# Patient Record
Sex: FEMALE | Race: WHITE | NOT HISPANIC OR LATINO | Employment: FULL TIME | URBAN - METROPOLITAN AREA
[De-identification: names, ages, dates, MRNs, and addresses within clinical notes are randomized per-mention and may not be internally consistent; named-entity substitution may affect disease eponyms.]

---

## 2021-08-03 ENCOUNTER — HOSPITAL ENCOUNTER (OUTPATIENT)
Facility: MEDICAL CENTER | Age: 61
End: 2021-08-05
Attending: EMERGENCY MEDICINE | Admitting: STUDENT IN AN ORGANIZED HEALTH CARE EDUCATION/TRAINING PROGRAM
Payer: COMMERCIAL

## 2021-08-03 DIAGNOSIS — C54.1 ENDOMETRIAL CANCER (HCC): ICD-10-CM

## 2021-08-03 DIAGNOSIS — N93.9 VAGINAL BLEEDING: ICD-10-CM

## 2021-08-03 DIAGNOSIS — D64.9 SYMPTOMATIC ANEMIA: ICD-10-CM

## 2021-08-03 LAB
ABO GROUP BLD: NORMAL
ALBUMIN SERPL BCP-MCNC: 3.3 G/DL (ref 3.2–4.9)
ALBUMIN/GLOB SERPL: 1.2 G/DL
ALP SERPL-CCNC: 55 U/L (ref 30–99)
ALT SERPL-CCNC: 17 U/L (ref 2–50)
ANION GAP SERPL CALC-SCNC: 11 MMOL/L (ref 7–16)
AST SERPL-CCNC: 21 U/L (ref 12–45)
BASOPHILS # BLD AUTO: 0.1 % (ref 0–1.8)
BASOPHILS # BLD: 0.01 K/UL (ref 0–0.12)
BILIRUB SERPL-MCNC: 0.4 MG/DL (ref 0.1–1.5)
BLD GP AB SCN SERPL QL: NORMAL
BUN SERPL-MCNC: 18 MG/DL (ref 8–22)
CALCIUM SERPL-MCNC: 8.3 MG/DL (ref 8.5–10.5)
CHLORIDE SERPL-SCNC: 100 MMOL/L (ref 96–112)
CO2 SERPL-SCNC: 22 MMOL/L (ref 20–33)
CREAT SERPL-MCNC: 0.72 MG/DL (ref 0.5–1.4)
EOSINOPHIL # BLD AUTO: 0 K/UL (ref 0–0.51)
EOSINOPHIL NFR BLD: 0 % (ref 0–6.9)
ERYTHROCYTE [DISTWIDTH] IN BLOOD BY AUTOMATED COUNT: 55.8 FL (ref 35.9–50)
GLOBULIN SER CALC-MCNC: 2.8 G/DL (ref 1.9–3.5)
GLUCOSE BLD-MCNC: 196 MG/DL (ref 65–99)
GLUCOSE SERPL-MCNC: 205 MG/DL (ref 65–99)
HCT VFR BLD AUTO: 25.2 % (ref 37–47)
HGB BLD-MCNC: 7.6 G/DL (ref 12–16)
IMM GRANULOCYTES # BLD AUTO: 0.07 K/UL (ref 0–0.11)
IMM GRANULOCYTES NFR BLD AUTO: 0.6 % (ref 0–0.9)
INR PPP: 1.1 (ref 0.87–1.13)
LYMPHOCYTES # BLD AUTO: 1.48 K/UL (ref 1–4.8)
LYMPHOCYTES NFR BLD: 13.4 % (ref 22–41)
MCH RBC QN AUTO: 23.2 PG (ref 27–33)
MCHC RBC AUTO-ENTMCNC: 30.2 G/DL (ref 33.6–35)
MCV RBC AUTO: 77.1 FL (ref 81.4–97.8)
MONOCYTES # BLD AUTO: 0.29 K/UL (ref 0–0.85)
MONOCYTES NFR BLD AUTO: 2.6 % (ref 0–13.4)
NEUTROPHILS # BLD AUTO: 9.18 K/UL (ref 2–7.15)
NEUTROPHILS NFR BLD: 83.3 % (ref 44–72)
NRBC # BLD AUTO: 0.02 K/UL
NRBC BLD-RTO: 0.2 /100 WBC
PLATELET # BLD AUTO: 198 K/UL (ref 164–446)
PMV BLD AUTO: 11.6 FL (ref 9–12.9)
POTASSIUM SERPL-SCNC: 3.9 MMOL/L (ref 3.6–5.5)
PROT SERPL-MCNC: 6.1 G/DL (ref 6–8.2)
PROTHROMBIN TIME: 13.9 SEC (ref 12–14.6)
RBC # BLD AUTO: 3.27 M/UL (ref 4.2–5.4)
RH BLD: NORMAL
SODIUM SERPL-SCNC: 133 MMOL/L (ref 135–145)
WBC # BLD AUTO: 11 K/UL (ref 4.8–10.8)

## 2021-08-03 PROCEDURE — 80053 COMPREHEN METABOLIC PANEL: CPT

## 2021-08-03 PROCEDURE — 86901 BLOOD TYPING SEROLOGIC RH(D): CPT

## 2021-08-03 PROCEDURE — 82962 GLUCOSE BLOOD TEST: CPT

## 2021-08-03 PROCEDURE — 85025 COMPLETE CBC W/AUTO DIFF WBC: CPT

## 2021-08-03 PROCEDURE — 86900 BLOOD TYPING SEROLOGIC ABO: CPT

## 2021-08-03 PROCEDURE — 85610 PROTHROMBIN TIME: CPT

## 2021-08-03 PROCEDURE — 99285 EMERGENCY DEPT VISIT HI MDM: CPT

## 2021-08-03 PROCEDURE — 86850 RBC ANTIBODY SCREEN: CPT

## 2021-08-03 RX ORDER — FERROUS SULFATE 325(65) MG
325 TABLET ORAL DAILY
Status: ON HOLD | COMMUNITY
End: 2023-01-01

## 2021-08-03 RX ORDER — ROSUVASTATIN CALCIUM 40 MG/1
40 TABLET, COATED ORAL DAILY
COMMUNITY

## 2021-08-03 RX ORDER — INSULIN GLARGINE 100 [IU]/ML
64 INJECTION, SOLUTION SUBCUTANEOUS EVERY EVENING
Status: ON HOLD | COMMUNITY
End: 2023-01-01

## 2021-08-03 RX ORDER — LEVOTHYROXINE SODIUM 175 UG/1
175 TABLET ORAL
COMMUNITY

## 2021-08-03 RX ORDER — GLIMEPIRIDE 4 MG/1
4 TABLET ORAL DAILY
Status: ON HOLD | COMMUNITY
End: 2023-01-01

## 2021-08-03 RX ORDER — LIRAGLUTIDE 6 MG/ML
1.8 INJECTION SUBCUTANEOUS DAILY
Status: ON HOLD | COMMUNITY
End: 2023-01-01

## 2021-08-04 PROBLEM — D62 ACUTE BLOOD LOSS ANEMIA: Status: ACTIVE | Noted: 2021-08-04

## 2021-08-04 PROBLEM — D50.9 IDA (IRON DEFICIENCY ANEMIA): Status: ACTIVE | Noted: 2021-08-04

## 2021-08-04 PROBLEM — E66.01 CLASS 3 SEVERE OBESITY IN ADULT (HCC): Status: ACTIVE | Noted: 2021-08-04

## 2021-08-04 PROBLEM — C54.1 ENDOMETRIAL CARCINOMA (HCC): Status: ACTIVE | Noted: 2021-08-04

## 2021-08-04 PROBLEM — E03.9 HYPOTHYROIDISM: Status: ACTIVE | Noted: 2021-08-04

## 2021-08-04 PROBLEM — C64.2 RENAL CELL CARCINOMA OF LEFT KIDNEY (HCC): Status: ACTIVE | Noted: 2021-08-04

## 2021-08-04 PROBLEM — E78.5 HLD (HYPERLIPIDEMIA): Status: ACTIVE | Noted: 2021-08-04

## 2021-08-04 PROBLEM — E11.9 DM (DIABETES MELLITUS) (HCC): Status: ACTIVE | Noted: 2021-08-04

## 2021-08-04 PROBLEM — N93.9 VAGINAL BLEEDING: Status: ACTIVE | Noted: 2021-08-04

## 2021-08-04 PROBLEM — Z91.89 AT RISK FOR OBSTRUCTIVE SLEEP APNEA: Status: ACTIVE | Noted: 2021-08-04

## 2021-08-04 LAB
ABO + RH BLD: NORMAL
ALBUMIN SERPL BCP-MCNC: 3.3 G/DL (ref 3.2–4.9)
BASOPHILS # BLD AUTO: 0.3 % (ref 0–1.8)
BASOPHILS # BLD AUTO: 0.4 % (ref 0–1.8)
BASOPHILS # BLD: 0.02 K/UL (ref 0–0.12)
BASOPHILS # BLD: 0.02 K/UL (ref 0–0.12)
BUN SERPL-MCNC: 18 MG/DL (ref 8–22)
CALCIUM SERPL-MCNC: 8.2 MG/DL (ref 8.5–10.5)
CHLORIDE SERPL-SCNC: 101 MMOL/L (ref 96–112)
CO2 SERPL-SCNC: 23 MMOL/L (ref 20–33)
CREAT SERPL-MCNC: 0.81 MG/DL (ref 0.5–1.4)
EOSINOPHIL # BLD AUTO: 0.01 K/UL (ref 0–0.51)
EOSINOPHIL # BLD AUTO: 0.01 K/UL (ref 0–0.51)
EOSINOPHIL NFR BLD: 0.1 % (ref 0–6.9)
EOSINOPHIL NFR BLD: 0.2 % (ref 0–6.9)
ERYTHROCYTE [DISTWIDTH] IN BLOOD BY AUTOMATED COUNT: 53.2 FL (ref 35.9–50)
ERYTHROCYTE [DISTWIDTH] IN BLOOD BY AUTOMATED COUNT: 53.6 FL (ref 35.9–50)
FERRITIN SERPL-MCNC: 11.9 NG/ML (ref 10–291)
FOLATE SERPL-MCNC: 6.8 NG/ML
GLUCOSE BLD-MCNC: 199 MG/DL (ref 65–99)
GLUCOSE BLD-MCNC: 216 MG/DL (ref 65–99)
GLUCOSE BLD-MCNC: 245 MG/DL (ref 65–99)
GLUCOSE BLD-MCNC: 265 MG/DL (ref 65–99)
GLUCOSE SERPL-MCNC: 200 MG/DL (ref 65–99)
HCT VFR BLD AUTO: 24.5 % (ref 37–47)
HCT VFR BLD AUTO: 26.3 % (ref 37–47)
HGB BLD-MCNC: 7.4 G/DL (ref 12–16)
HGB BLD-MCNC: 8 G/DL (ref 12–16)
IMM GRANULOCYTES # BLD AUTO: 0.02 K/UL (ref 0–0.11)
IMM GRANULOCYTES # BLD AUTO: 0.05 K/UL (ref 0–0.11)
IMM GRANULOCYTES NFR BLD AUTO: 0.4 % (ref 0–0.9)
IMM GRANULOCYTES NFR BLD AUTO: 0.6 % (ref 0–0.9)
IRON SATN MFR SERPL: 63 % (ref 15–55)
IRON SERPL-MCNC: 200 UG/DL (ref 40–170)
LDH SERPL L TO P-CCNC: 211 U/L (ref 107–266)
LYMPHOCYTES # BLD AUTO: 1.26 K/UL (ref 1–4.8)
LYMPHOCYTES # BLD AUTO: 1.69 K/UL (ref 1–4.8)
LYMPHOCYTES NFR BLD: 21.5 % (ref 22–41)
LYMPHOCYTES NFR BLD: 23.6 % (ref 22–41)
MAGNESIUM SERPL-MCNC: 1.8 MG/DL (ref 1.5–2.5)
MCH RBC QN AUTO: 23.2 PG (ref 27–33)
MCH RBC QN AUTO: 23.8 PG (ref 27–33)
MCHC RBC AUTO-ENTMCNC: 30.2 G/DL (ref 33.6–35)
MCHC RBC AUTO-ENTMCNC: 30.4 G/DL (ref 33.6–35)
MCV RBC AUTO: 76.8 FL (ref 81.4–97.8)
MCV RBC AUTO: 78.3 FL (ref 81.4–97.8)
MONOCYTES # BLD AUTO: 0.13 K/UL (ref 0–0.85)
MONOCYTES # BLD AUTO: 0.25 K/UL (ref 0–0.85)
MONOCYTES NFR BLD AUTO: 2.4 % (ref 0–13.4)
MONOCYTES NFR BLD AUTO: 3.2 % (ref 0–13.4)
NEUTROPHILS # BLD AUTO: 3.91 K/UL (ref 2–7.15)
NEUTROPHILS # BLD AUTO: 5.84 K/UL (ref 2–7.15)
NEUTROPHILS NFR BLD: 73 % (ref 44–72)
NEUTROPHILS NFR BLD: 74.3 % (ref 44–72)
NRBC # BLD AUTO: 0 K/UL
NRBC # BLD AUTO: 0.02 K/UL
NRBC BLD-RTO: 0 /100 WBC
NRBC BLD-RTO: 0.3 /100 WBC
PHOSPHATE SERPL-MCNC: 3.7 MG/DL (ref 2.5–4.5)
PLATELET # BLD AUTO: 164 K/UL (ref 164–446)
PLATELET # BLD AUTO: 165 K/UL (ref 164–446)
PMV BLD AUTO: 11.6 FL (ref 9–12.9)
PMV BLD AUTO: 11.8 FL (ref 9–12.9)
POTASSIUM SERPL-SCNC: 3.5 MMOL/L (ref 3.6–5.5)
RBC # BLD AUTO: 3.19 M/UL (ref 4.2–5.4)
RBC # BLD AUTO: 3.36 M/UL (ref 4.2–5.4)
SODIUM SERPL-SCNC: 134 MMOL/L (ref 135–145)
TIBC SERPL-MCNC: 319 UG/DL (ref 250–450)
TRANSFERRIN SERPL-MCNC: 283 MG/DL (ref 200–370)
UIBC SERPL-MCNC: 119 UG/DL (ref 110–370)
VIT B12 SERPL-MCNC: 235 PG/ML (ref 211–911)
WBC # BLD AUTO: 5.4 K/UL (ref 4.8–10.8)
WBC # BLD AUTO: 7.9 K/UL (ref 4.8–10.8)

## 2021-08-04 PROCEDURE — 83010 ASSAY OF HAPTOGLOBIN QUANT: CPT

## 2021-08-04 PROCEDURE — 700102 HCHG RX REV CODE 250 W/ 637 OVERRIDE(OP): Performed by: INTERNAL MEDICINE

## 2021-08-04 PROCEDURE — 82746 ASSAY OF FOLIC ACID SERUM: CPT

## 2021-08-04 PROCEDURE — 80069 RENAL FUNCTION PANEL: CPT

## 2021-08-04 PROCEDURE — 96372 THER/PROPH/DIAG INJ SC/IM: CPT

## 2021-08-04 PROCEDURE — 82962 GLUCOSE BLOOD TEST: CPT | Mod: 91

## 2021-08-04 PROCEDURE — A9270 NON-COVERED ITEM OR SERVICE: HCPCS | Performed by: INTERNAL MEDICINE

## 2021-08-04 PROCEDURE — 36415 COLL VENOUS BLD VENIPUNCTURE: CPT

## 2021-08-04 PROCEDURE — 83550 IRON BINDING TEST: CPT

## 2021-08-04 PROCEDURE — 84466 ASSAY OF TRANSFERRIN: CPT

## 2021-08-04 PROCEDURE — 82728 ASSAY OF FERRITIN: CPT

## 2021-08-04 PROCEDURE — G0378 HOSPITAL OBSERVATION PER HR: HCPCS

## 2021-08-04 PROCEDURE — 83540 ASSAY OF IRON: CPT

## 2021-08-04 PROCEDURE — 83735 ASSAY OF MAGNESIUM: CPT

## 2021-08-04 PROCEDURE — 85025 COMPLETE CBC W/AUTO DIFF WBC: CPT

## 2021-08-04 PROCEDURE — A9270 NON-COVERED ITEM OR SERVICE: HCPCS | Performed by: STUDENT IN AN ORGANIZED HEALTH CARE EDUCATION/TRAINING PROGRAM

## 2021-08-04 PROCEDURE — 302146: Performed by: INTERNAL MEDICINE

## 2021-08-04 PROCEDURE — 700102 HCHG RX REV CODE 250 W/ 637 OVERRIDE(OP): Performed by: STUDENT IN AN ORGANIZED HEALTH CARE EDUCATION/TRAINING PROGRAM

## 2021-08-04 PROCEDURE — 82607 VITAMIN B-12: CPT

## 2021-08-04 PROCEDURE — 83615 LACTATE (LD) (LDH) ENZYME: CPT

## 2021-08-04 PROCEDURE — 99220 PR INITIAL OBSERVATION CARE,LEVL III: CPT | Performed by: STUDENT IN AN ORGANIZED HEALTH CARE EDUCATION/TRAINING PROGRAM

## 2021-08-04 RX ORDER — PROMETHAZINE HYDROCHLORIDE 25 MG/1
12.5-25 TABLET ORAL EVERY 4 HOURS PRN
Status: DISCONTINUED | OUTPATIENT
Start: 2021-08-04 | End: 2021-08-05 | Stop reason: HOSPADM

## 2021-08-04 RX ORDER — CLONIDINE HYDROCHLORIDE 0.1 MG/1
0.1 TABLET ORAL EVERY 6 HOURS PRN
Status: DISCONTINUED | OUTPATIENT
Start: 2021-08-04 | End: 2021-08-05 | Stop reason: HOSPADM

## 2021-08-04 RX ORDER — ENALAPRILAT 1.25 MG/ML
1.25 INJECTION INTRAVENOUS EVERY 6 HOURS PRN
Status: DISCONTINUED | OUTPATIENT
Start: 2021-08-04 | End: 2021-08-05 | Stop reason: HOSPADM

## 2021-08-04 RX ORDER — LEVOTHYROXINE SODIUM 112 UG/1
224 TABLET ORAL
Status: DISCONTINUED | OUTPATIENT
Start: 2021-08-04 | End: 2021-08-05 | Stop reason: HOSPADM

## 2021-08-04 RX ORDER — DEXTROSE MONOHYDRATE 25 G/50ML
50 INJECTION, SOLUTION INTRAVENOUS
Status: DISCONTINUED | OUTPATIENT
Start: 2021-08-04 | End: 2021-08-05 | Stop reason: HOSPADM

## 2021-08-04 RX ORDER — POTASSIUM CHLORIDE 20 MEQ/1
40 TABLET, EXTENDED RELEASE ORAL ONCE
Status: COMPLETED | OUTPATIENT
Start: 2021-08-04 | End: 2021-08-04

## 2021-08-04 RX ORDER — PROCHLORPERAZINE EDISYLATE 5 MG/ML
5-10 INJECTION INTRAMUSCULAR; INTRAVENOUS EVERY 4 HOURS PRN
Status: DISCONTINUED | OUTPATIENT
Start: 2021-08-04 | End: 2021-08-05 | Stop reason: HOSPADM

## 2021-08-04 RX ORDER — BISACODYL 10 MG
10 SUPPOSITORY, RECTAL RECTAL
Status: DISCONTINUED | OUTPATIENT
Start: 2021-08-04 | End: 2021-08-05 | Stop reason: HOSPADM

## 2021-08-04 RX ORDER — ONDANSETRON 2 MG/ML
4 INJECTION INTRAMUSCULAR; INTRAVENOUS EVERY 4 HOURS PRN
Status: DISCONTINUED | OUTPATIENT
Start: 2021-08-04 | End: 2021-08-05 | Stop reason: HOSPADM

## 2021-08-04 RX ORDER — AMOXICILLIN 250 MG
2 CAPSULE ORAL 2 TIMES DAILY
Status: DISCONTINUED | OUTPATIENT
Start: 2021-08-04 | End: 2021-08-05 | Stop reason: HOSPADM

## 2021-08-04 RX ORDER — ROSUVASTATIN CALCIUM 20 MG/1
40 TABLET, COATED ORAL DAILY
Status: DISCONTINUED | OUTPATIENT
Start: 2021-08-04 | End: 2021-08-05 | Stop reason: HOSPADM

## 2021-08-04 RX ORDER — PROMETHAZINE HYDROCHLORIDE 25 MG/1
12.5-25 SUPPOSITORY RECTAL EVERY 4 HOURS PRN
Status: DISCONTINUED | OUTPATIENT
Start: 2021-08-04 | End: 2021-08-05 | Stop reason: HOSPADM

## 2021-08-04 RX ORDER — ONDANSETRON 4 MG/1
4 TABLET, ORALLY DISINTEGRATING ORAL EVERY 4 HOURS PRN
Status: DISCONTINUED | OUTPATIENT
Start: 2021-08-04 | End: 2021-08-05 | Stop reason: HOSPADM

## 2021-08-04 RX ORDER — ACETAMINOPHEN 325 MG/1
650 TABLET ORAL EVERY 6 HOURS PRN
Status: DISCONTINUED | OUTPATIENT
Start: 2021-08-04 | End: 2021-08-05 | Stop reason: HOSPADM

## 2021-08-04 RX ORDER — LABETALOL HYDROCHLORIDE 5 MG/ML
10 INJECTION, SOLUTION INTRAVENOUS EVERY 4 HOURS PRN
Status: DISCONTINUED | OUTPATIENT
Start: 2021-08-04 | End: 2021-08-05 | Stop reason: HOSPADM

## 2021-08-04 RX ORDER — POLYETHYLENE GLYCOL 3350 17 G/17G
1 POWDER, FOR SOLUTION ORAL
Status: DISCONTINUED | OUTPATIENT
Start: 2021-08-04 | End: 2021-08-05 | Stop reason: HOSPADM

## 2021-08-04 RX ORDER — FERROUS SULFATE 325(65) MG
325 TABLET ORAL DAILY
Status: DISCONTINUED | OUTPATIENT
Start: 2021-08-04 | End: 2021-08-05 | Stop reason: HOSPADM

## 2021-08-04 RX ORDER — INSULIN GLARGINE 100 [IU]/ML
64 INJECTION, SOLUTION SUBCUTANEOUS EVERY EVENING
Status: DISCONTINUED | OUTPATIENT
Start: 2021-08-04 | End: 2021-08-04

## 2021-08-04 RX ADMIN — ACETAMINOPHEN 650 MG: 325 TABLET, FILM COATED ORAL at 20:38

## 2021-08-04 RX ADMIN — INSULIN HUMAN 2 UNITS: 100 INJECTION, SOLUTION PARENTERAL at 07:05

## 2021-08-04 RX ADMIN — DOCUSATE SODIUM 50 MG AND SENNOSIDES 8.6 MG 2 TABLET: 8.6; 5 TABLET, FILM COATED ORAL at 17:21

## 2021-08-04 RX ADMIN — POTASSIUM CHLORIDE 40 MEQ: 1500 TABLET, EXTENDED RELEASE ORAL at 09:58

## 2021-08-04 RX ADMIN — INSULIN GLARGINE 25 UNITS: 100 INJECTION, SOLUTION SUBCUTANEOUS at 17:23

## 2021-08-04 RX ADMIN — Medication 400 MG: at 09:58

## 2021-08-04 RX ADMIN — INSULIN HUMAN 5 UNITS: 100 INJECTION, SOLUTION PARENTERAL at 20:31

## 2021-08-04 RX ADMIN — INSULIN HUMAN 3 UNITS: 100 INJECTION, SOLUTION PARENTERAL at 12:30

## 2021-08-04 RX ADMIN — LEVOTHYROXINE SODIUM 224 MCG: 0.11 TABLET ORAL at 06:12

## 2021-08-04 RX ADMIN — ROSUVASTATIN CALCIUM 40 MG: 20 TABLET, FILM COATED ORAL at 17:20

## 2021-08-04 RX ADMIN — FERROUS SULFATE TAB 325 MG (65 MG ELEMENTAL FE) 325 MG: 325 (65 FE) TAB at 06:12

## 2021-08-04 RX ADMIN — INSULIN HUMAN 3 UNITS: 100 INJECTION, SOLUTION PARENTERAL at 17:25

## 2021-08-04 ASSESSMENT — FIBROSIS 4 INDEX
FIB4 SCORE: 1.85
FIB4 SCORE: 1.86

## 2021-08-04 ASSESSMENT — ENCOUNTER SYMPTOMS
SHORTNESS OF BREATH: 0
DEPRESSION: 0
FOCAL WEAKNESS: 0
HEARTBURN: 0
PALPITATIONS: 0
MYALGIAS: 0
NAUSEA: 1
BLURRED VISION: 0
BRUISES/BLEEDS EASILY: 1
CHILLS: 0
FLANK PAIN: 0
ABDOMINAL PAIN: 0
FALLS: 0
VOMITING: 0
DIZZINESS: 1
SPEECH CHANGE: 0
COUGH: 0
HEADACHES: 0
DOUBLE VISION: 0
FEVER: 0

## 2021-08-04 ASSESSMENT — LIFESTYLE VARIABLES: SUBSTANCE_ABUSE: 0

## 2021-08-04 ASSESSMENT — PAIN DESCRIPTION - PAIN TYPE
TYPE: ACUTE PAIN
TYPE: ACUTE PAIN

## 2021-08-04 NOTE — ED NOTES
Pt changed pad again x1 - was moderately saturated + clot per pt. Has now changed pad 3 times since arrival to ED at 2230 last night.
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
## 2021-08-04 NOTE — PROGRESS NOTES
4 Eyes Skin Assessment Completed by Fe RN and Uriel RN.    Head WDL  Ears WDL  Nose WDL  Mouth WDL  Neck WDL  Breast/Chest Incision--new port site. Port not accessed. Patient has steri-strip in place.   Shoulder Blades WDL  Spine WDL  (R) Arm/Elbow/Hand WDL  (L) Arm/Elbow/Hand WDL  Abdomen WDL  Groin WDL  Scrotum/Coccyx/Buttocks WDL  (R) Leg WDL  (L) Leg WDL  (R) Heel/Foot/Toe WDL  (L) Heel/Foot/Toe WDL          Devices In Places tele box, PIV      Interventions In Place Pillows    Possible Skin Injury No    Pictures Uploaded Into Epic N/A  Wound Consult Placed N/A  RN Wound Prevention Protocol Ordered No

## 2021-08-04 NOTE — ED PROVIDER NOTES
ED Provider Note    Chief Complaint:   Vaginal bleeding, symptomatic anemia    HPI:  Tena Chaudhry is a very pleasant 60-year-old woman who presents to the emergency department for evaluation of symptomatic anemia due to vaginal bleeding in the setting of known endometrial cancer.  Her symptoms began around 2:00 this afternoon, when she began to develop vaginal bleeding.  She states when she stood up she had very heavy bleeding that persisted.  Around 4:00 PM, she noticed some lightheadedness and shortness of breath, and realized that she was having to grab onto objects when she got up due to lightheadedness and the need to steady herself.  As she was feeling unwell, she went to [facility] where she was found to have significant anemia.  She was transfused 2 units, and transferred to our facility for gynecologic and oncologic services not available at [facility].  She has received her previous care at [facility], however that facility is at capacity, and currently on diversion.    She is not having any other abnormal bleeding or bruising.  She states that her vaginal bleeding has improved since she was seen at that facility.  She is not having any active chest pain, she is not having any active lightheadedness, nor shortness of breath.  She states that she had her first chemotherapy treatment yesterday.  She is unable to identify any exacerbating or alleviating factors.    Review of Systems:  See HPI for pertinent positives and negatives. All other systems negative.    Past Medical History:       Social History:  Social History     Tobacco Use   • Smoking status: Not on file   Substance and Sexual Activity   • Alcohol use: Not on file   • Drug use: Not on file   • Sexual activity: Not on file       Surgical History:  patient denies any surgical history    Current Medications:  Home Medications     Reviewed by Kathi Villalobos, PhT (Pharmacy Tech) on 08/03/21 at 5367  Med List Status:  "Complete   Medication Last Dose Status   aspirin EC (ECOTRIN) 81 MG Tablet Delayed Response 8/3/2021 Active   ferrous sulfate 325 (65 Fe) MG tablet 8/3/2021 Active   glimepiride (AMARYL) 4 MG Tab 8/2/2021 Active   insulin glargine (LANTUS) 100 UNIT/ML Solution 8/2/2021 Active   levothyroxine (SYNTHROID) 112 MCG Tab 8/3/2021 Active   liraglutide (VICTOZA) 18 MG/3ML Solution Pen-injector 8/2/2021 Active   metFORMIN (GLUCOPHAGE) 500 MG Tab 8/3/2021 Active   rosuvastatin (CRESTOR) 40 MG tablet 8/2/2021 Active                Allergies:  No Known Allergies    Physical Exam:  Vital Signs: /53   Pulse 97   Temp 36.6 °C (97.9 °F) (Temporal)   Resp 16   Ht 1.575 m (5' 2\")   Wt 112 kg (246 lb)   SpO2 98%   BMI 44.99 kg/m²   Constitutional: Alert, no acute distress  HENT: Normocephalic, mask in place  Eyes: Pupils equal and reactive, normal conjunctiva  Neck: Supple, normal range of motion, no stridor  Cardiovascular: Extremities are warm and well perfused, no murmur appreciated, normal cardiac auscultation, tachycardic rate  Pulmonary: No respiratory distress, normal work of breathing, no accessory muscule usage, breath sounds clear and equal bilaterally, no wheezing, no coarse breath sounds, pulmonary auscultation limited by BMI  Abdomen: Soft, non-distended, non-tender to palpation, no peritoneal signs, abdominal exam limited by BMI  : External exam notes no brisk bleed, pad is in place that is not soaked, small amount of blood oozing from the introitus  Skin: Warm, dry, no rashes or lesions  Musculoskeletal: Normal range of motion in all extremities, no swelling or deformity noted  Neurologic: Alert, oriented, normal speech, normal motor function  Psychiatric: Normal and appropriate mood and affect    Medical records reviewed for continuity of care.  Emergency physician report reviewed from [facility], referring facility.  Ms. Chaudhry has a past medical history of endometrial cancer as well as " renal cancer, both primary, and she recently started chemotherapy for endometrial cancer yesterday.  She presents to the emergency department with brisk vaginal bleeding and dark blood with clots.  She also reports lightheadedness and shortness of breath.  On arrival she was noted to be tachycardic with a heart rate of 112.  She also became transiently hypotensive after arrival.  Hemoglobin was 6.3, she was treated with 2 units of crossmatched blood.  She receives her care at [facility], however they were on diversion, so the patient was transferred to our facility for oncology and gynecology evaluation.  Tachycardia improved after administration of 2 units of packed red blood cells.  White blood count reported that 10.7.    Medical records reviewed from [facility] using the Epic Share Everywhere feature.  I reviewed her visit with Dr. Greene, gynecologist, on 6/29/2021.  At that time, she presented with newly diagnosed likely metastatic endometrial cancer and renal mass concerning for renal cell carcinoma.  Consultation with Dr. Terrell, oncologist, noted with plan for neoadjuvant chemotherapy.  Endometrial polyp biopsy from 5/14/2021 notes inflamed endocervical polyp with squamous metaplasia.  Cervix biopsy notes adenocarcinoma with extensive necrosis.  Endometrial biopsy notes high-grade endometrial adenocarcinoma.  PET/CT notes large, infiltrative heterogenous enhancing endometrial mass that expands the endometrial canal and cervix with involvement of the right vaginal fornix.  Mass demonstrates intense FDG activity with numerous FDG avid peritoneal mesenteric and serosal tumor implants, consistent with diffuse peritoneal carcinomatosis.  In addition, there are multiple enlarged left internal iliac lymph nodes with intense FDG activity, consistent with additional regional chel metastases.  4.5 cm heterogenous Patrick enhancing left upper pole renal mass with internal cystic changes and moderate FDG uptake, which appears less than  surrounding renal parenchyma, consistent with previously diagnosed RCC.  Given these imaging findings, this mass is favored to represent papillary subtype.  There is no evidence of invasion of the renal vasculature or Gerota's fascia.  In regards to uterine cancer, recommendation is for chemotherapy.  After 3 cycles of chemotherapy, recommend imaging in assessing whether it is reasonable to proceed with hysterectomy and cancer resection.    Labs:  Labs Reviewed   CBC WITH DIFFERENTIAL - Abnormal; Notable for the following components:       Result Value    WBC 11.0 (*)     RBC 3.27 (*)     Hemoglobin 7.6 (*)     Hematocrit 25.2 (*)     MCV 77.1 (*)     MCH 23.2 (*)     MCHC 30.2 (*)     RDW 55.8 (*)     Neutrophils-Polys 83.30 (*)     Lymphocytes 13.40 (*)     Neutrophils (Absolute) 9.18 (*)     All other components within normal limits    Narrative:     Indicate which anticoagulants the patient is on:->UNKNOWN   COMP METABOLIC PANEL - Abnormal; Notable for the following components:    Sodium 133 (*)     Glucose 205 (*)     Calcium 8.3 (*)     All other components within normal limits    Narrative:     Indicate which anticoagulants the patient is on:->UNKNOWN   POCT GLUCOSE DEVICE RESULTS - Abnormal; Notable for the following components:    Glucose - Accu-Ck 196 (*)     All other components within normal limits   COD (ADULT)   PROTHROMBIN TIME    Narrative:     Indicate which anticoagulants the patient is on:->UNKNOWN   ABO RH CONFIRM   ESTIMATED GFR    Narrative:     Indicate which anticoagulants the patient is on:->UNKNOWN       Radiology:  No orders to display        ED Medications Administered:  Medications - No data to display    Differential diagnosis:  Symptomatic anemia, vaginal bleeding, worsening metastatic disease, coagulopathy    MDM:  Ms. Chaudhry presents to the emergency department today for evaluation of vaginal bleeding causing symptomatic anemia.  On arrival to the emergency department she states she  is feeling significantly improved, systolic blood pressure on arrival is 111, she is still mildly tachycardic to 105 after receiving 2 units of packed red blood cells.    On laboratory evaluation CMP shows normal liver enzymes, no significant electrolyte abnormalities.  Glucose is 205, consistent with her history of diabetes.  Her white blood count is 11.0, slightly increased from referring facility.  Hemoglobin at this time is 7.6, increased from 6.3 at outlying facility.  She does have a normal platelet count.  INR is normal at 1.10.  Type and screen sent.    Plan is time is for admission for continued monitoring of H&H.  At this time, she has not had any brisk vaginal bleeding in the emergency department, continues to feel well, with no lightheadedness no shortness of breath.  I did call the gynecologist on-call, however he noted that if she had any worsening symptoms or decompensation, gynecologic oncologist would have to be consulted.  Patient mated to hospitalist, for further monitoring, and serial H&H's.    Personal protective equipment including N95 surgical respirator, goggles, and gloves were used during this encounter.       Disposition:  Admit to hospitalist in guarded condition    Final Impression:  1. Symptomatic anemia    2. Vaginal bleeding    3. Endometrial cancer (HCC)        Electronically signed by: Kayy Zuluaga MD, 8/4/2021 5:29 AM

## 2021-08-04 NOTE — ASSESSMENT & PLAN NOTE
High grade endometrial adenocarcinoma, FIGO III   5/14/21  AdenoCA of cervix with necrosis  5/14/21     elevated at 230 5/27/21    Followed by [facility] oncology (Dr. Ng) -- plan is to initiate 3cycles on chemotherapy, then reimage, surgery if tumor has shrunken  Also followed by medical oncology Dr. Terrell in [place], [place] -- started on chemotherapy 8/3     F/u with primary [facility] outpatient

## 2021-08-04 NOTE — H&P
Hospital Medicine History & Physical Note    Date of Service  8/4/2021    Primary Care Physician  Padmini Lunsford M.D.    Consultants  GYN    Code Status  Full Code    Chief Complaint  Chief Complaint   Patient presents with   • Sent by [facility] as EMS transfer from [facility] ani vaginal bleeding and HGB of 6.3, HCT 22. Pt has endometrial cancer and renal cell carcinoma currently receiving treatment at [facility]. 1st chemo treatment yesterday. Accepting ERP [facility], available for any ERP.       History of Presenting Illness  Tena Chaudhry is a 60 y.o. female with history of metastatic endometrial carcinoma diagnosed 5/2021, followed by [facility] GYN oncology who presented 8/3/2021 to renal ER as a transfer from [facility] for GYN evaluation for complaint of vaginal bleeding and symptomatic anemia.  Patient reported being followed by GYN oncology, known to have endometrial carcinoma as well as left renal cell carcinoma or left renal metastatic disease -- s/p partial left nephrectomy 7/7/21.  She was planned to have 3 cycles of chemotherapy, reimage and evaluate the need for surgery if tumor has shrunken.  Patient reported receiving first cycle of chemotherapy 8/3/21, then reported having copious amount of vaginal bleeding in the same evening then went to [facility] for evaluation where she was found to hemoglobin of 6.3.  She was given 2 units PRBC then transferred to [facility] for GYN evaluation.  Hemoglobin 7.6 at [facility], bleeding appears to have subsided.  I requested ERP to consult GYN.  Patient is being admitted for observation.    I discussed the plan of care with patient, bedside RN, ERP.    Review of Systems  Review of Systems   Constitutional: Positive for malaise/fatigue. Negative for chills and fever.   HENT: Negative for hearing loss and tinnitus.    Eyes: Negative for blurred vision and double vision.   Respiratory: Negative for cough and shortness of breath.    Cardiovascular: Negative  for chest pain, palpitations and leg swelling.   Gastrointestinal: Positive for nausea. Negative for abdominal pain, heartburn and vomiting.   Genitourinary: Negative for dysuria and flank pain.        Vaginal bleeding, clots   Musculoskeletal: Negative for falls and myalgias.   Skin: Negative for itching and rash.   Neurological: Positive for dizziness. Negative for speech change, focal weakness and headaches.   Endo/Heme/Allergies: Negative for environmental allergies. Bruises/bleeds easily.   Psychiatric/Behavioral: Negative for depression, substance abuse and suicidal ideas.   All other systems reviewed and are negative.      Past Medical History   has no past medical history on file.  Hypertension, hyperlipidemia, diabetes  Endometrial carcinoma  Renal cell carcinoma, left    Surgical History   has no past surgical history on file.   Left partial nephrectomy 7/7/2021  Endometrial polyp biopsy  Cervical biopsy    Family History  family history is not on file.   FH of HTN  Paternal nephew had brain cancer  Family history reviewed with patient. There is no family history that is pertinent to the chief complaint.     Social History   Denies tobacco use  Social EtOH use    Allergies  No Known Allergies    Medications  Prior to Admission Medications   Prescriptions Last Dose Informant Patient Reported? Taking?   aspirin EC (ECOTRIN) 81 MG Tablet Delayed Response 8/3/2021 at 0800 Patient Yes Yes   Sig: Take 81 mg by mouth every day.   ferrous sulfate 325 (65 Fe) MG tablet 8/3/2021 at 0800 Patient Yes Yes   Sig: Take 325 mg by mouth every day.   glimepiride (AMARYL) 4 MG Tab 8/2/2021 at PM Patient Yes Yes   Sig: Take 4 mg by mouth every day.   insulin glargine (LANTUS) 100 UNIT/ML Solution 8/2/2021 at PM Patient Yes Yes   Sig: Inject 64 Units under the skin every evening.   levothyroxine (SYNTHROID) 112 MCG Tab 8/3/2021 at 0800 Patient Yes Yes   Sig: Take 224 mcg by mouth every morning on an empty stomach.    liraglutide (VICTOZA) 18 MG/3ML Solution Pen-injector 8/2/2021 at PM Patient Yes Yes   Sig: Inject 1.8 mg under the skin every day.   metFORMIN (GLUCOPHAGE) 500 MG Tab 8/3/2021 at 0800 Patient Yes Yes   Sig: Take 1,000 mg by mouth 2 times a day with meals. 2 tabs = 1000 mg   rosuvastatin (CRESTOR) 40 MG tablet 8/2/2021 at PM Patient Yes Yes   Sig: Take 40 mg by mouth every day.      Facility-Administered Medications: None       Physical Exam  Temp:  [36.6 °C (97.9 °F)] 36.6 °C (97.9 °F)  Pulse:  [] 94  Resp:  [16] 16  BP: ()/(51-55) 102/53  SpO2:  [94 %-98 %] 95 %    Physical Exam  Vitals and nursing note reviewed.   Constitutional:       General: She is not in acute distress.     Appearance: She is obese.   HENT:      Head: Normocephalic and atraumatic.      Nose: Nose normal.      Mouth/Throat:      Mouth: Mucous membranes are dry.      Pharynx: Oropharynx is clear.   Eyes:      General: No scleral icterus.     Extraocular Movements: Extraocular movements intact.   Cardiovascular:      Rate and Rhythm: Normal rate and regular rhythm.      Pulses: Normal pulses.      Heart sounds: No friction rub.   Pulmonary:      Effort: Pulmonary effort is normal. No respiratory distress.      Breath sounds: No wheezing.   Abdominal:      General: There is no distension.      Palpations: Abdomen is soft.      Tenderness: There is no abdominal tenderness. There is no guarding or rebound.   Genitourinary:     Comments: Notable bloody discharge in underwear  No CMT, no apparent clot  GYN physical exam performed on patient with another female health provider presence  Musculoskeletal:         General: No tenderness. Normal range of motion.      Cervical back: Neck supple. No tenderness.      Comments: Trace LE edema   Skin:     General: Skin is warm and dry.      Capillary Refill: Capillary refill takes less than 2 seconds.      Coloration: Skin is pale.   Neurological:      General: No focal deficit present.       Mental Status: She is alert and oriented to person, place, and time.      Motor: No weakness.   Psychiatric:         Mood and Affect: Mood normal.         Laboratory:  Recent Labs     08/03/21 2234   WBC 11.0*   RBC 3.27*   HEMOGLOBIN 7.6*   HEMATOCRIT 25.2*   MCV 77.1*   MCH 23.2*   MCHC 30.2*   RDW 55.8*   PLATELETCT 198   MPV 11.6     Recent Labs     08/03/21  2234   SODIUM 133*   POTASSIUM 3.9   CHLORIDE 100   CO2 22   GLUCOSE 205*   BUN 18   CREATININE 0.72   CALCIUM 8.3*     Recent Labs     08/03/21  2234   ALTSGPT 17   ASTSGOT 21   ALKPHOSPHAT 55   TBILIRUBIN 0.4   GLUCOSE 205*     Recent Labs     08/03/21  2234   INR 1.10     No results for input(s): NTPROBNP in the last 72 hours.      No results for input(s): TROPONINT in the last 72 hours.    Imaging:  No orders to display       no X-Ray or EKG requiring interpretation    Assessment/Plan:  I anticipate this patient is appropriate for observation status at this time.    * Vaginal bleeding  Assessment & Plan  Vaginal bleeding after 1st cycle of chemotherapy for metastatic endometrial CA    Transferred from [facility]  Hgb 6.3 -- 2units pRBC -- 7.6    Bleeding appears to have subsided    Requested ERP to consult GYN -- no further rec by [facility] GYN, advised to f/u with primary [facility] GYN oncology    Endometrial carcinoma (HCC)  Assessment & Plan  High grade endometrial adenocarcinoma, FIGO III   5/14/21  AdenoCA of cervix with necrosis  5/14/21     elevated at 230 5/27/21    Followed by [facility] GYN oncology (Dr. Ng) -- plan is to initiate 3cycles on chemotherapy, then reimage, surgery if tumor has shrunken  Also followed by medical oncology Dr. Terrell in [place], [place] -- started on chemotherapy 8/3     F/u with primary [facility] GYN outpatient    Acute blood loss anemia  Assessment & Plan  Vaginal bleeding, h/o endometerial Ca, left RCC    Hgb 6.3 at [facility] -- 2units pRBC transfused  Hgb 7.6 at [facility]    Transfuse for Hgb <7 or hemodynamic  instability    MISSY (iron deficiency anemia)  Assessment & Plan  Continue PO iron supplement    Renal cell carcinoma of left kidney (HCC)  Assessment & Plan  S/p left partial nephrectomy 7/7/21    Hypothyroidism  Assessment & Plan  synthroid    HLD (hyperlipidemia)  Assessment & Plan  statin    DM (diabetes mellitus) (HCC)  Assessment & Plan  Lantus + ISS  Hold home metformin, Victoza    At risk for obstructive sleep apnea  Assessment & Plan  Outpatient sleep study    Class 3 severe obesity in adult ([facility])  Assessment & Plan  Diet and lifestyle modifications      VTE prophylaxis: SCDs/TEDs

## 2021-08-04 NOTE — ASSESSMENT & PLAN NOTE
Vaginal bleeding after 1st cycle of chemotherapy for metastatic endometrial CA    Transferred from [facility]  Hgb 6.3 -- 2units pRBC -- 7.6    Bleeding appears to have subsided    Requested ERP to consult GYN -- no further rec by [facility], advised to f/u with primary [facility] GYN oncology

## 2021-08-04 NOTE — ED NOTES
Report given to KRISTOPHER Miramontes. Pt transferred up to floor on tele monitor with all belongings.

## 2021-08-04 NOTE — PROGRESS NOTES
Patient brought to floor by this RN and KRISTOPHER Trevino. Patient A&Ox4, on room air, ambulatory. Patient has purse at bedside. Patient updated on plan of care, vocalized understanding. Patient has call light within reach, fall precautions in place. Will continue to monitor.

## 2021-08-04 NOTE — ED NOTES
Med Rec completed: per patient at bedside  Preferred Pharmacy: [facility]   Allergies:  No Known Allergies    No ORAL antibiotics in last 14 days    Home Medications:  Current Outpatient Medications on File Prior to Encounter   Medication Sig Informant Comments   • insulin glargine (LANTUS) 100 UNIT/ML Solution Inject 64 Units under the skin every evening. Per patient     • metFORMIN (GLUCOPHAGE) 500 MG Tab Take 1,000 mg by mouth 2 times a day with meals.   2 tabs = 1000 mg Per patient     • levothyroxine (SYNTHROID) 112 MCG Tab Take 224 mcg by mouth every morning on an empty stomach. Per patient     • aspirin EC (ECOTRIN) 81 MG Tablet Delayed Response Take 81 mg by mouth every day. Per patient     • glimepiride (AMARYL) 4 MG Tab Take 4 mg by mouth every day. Per patient     • rosuvastatin (CRESTOR) 40 MG tablet Take 40 mg by mouth every day. Per patient     • ferrous sulfate 325 (65 Fe) MG tablet Take 325 mg by mouth every day. Per patient     • liraglutide (VICTOZA) 18 MG/3ML Solution Pen-injector Inject 1.8 mg under the skin every day. Per patient

## 2021-08-04 NOTE — ASSESSMENT & PLAN NOTE
Vaginal bleeding, h/o endometerial Ca, left RCC    Hgb 6.3 at [facility] -- 2units pRBC transfused  Hgb 7.6 at [facility] ER    Transfuse for Hgb <7 or hemodynamic instability

## 2021-08-04 NOTE — PROGRESS NOTES
Ms. Tena Chaudhry is a 60 y.o. female who was admitted this morning by my colleague Dr. Noble Seaman for acute blood loss, symptomatic anemia.  I personally reviewed the H&P, labs and imaging.      Patient was seen and examined at bedside.      Ms. Tena Chaudhry is a 60 y.o. female with history of metastatic endometrial carcinoma followed by [facility] GYN oncology, left renal cell carcinoma or left renal metastatic disease status post partial left nephrectomy 7/7/2021 who presented on 8/3/2021 with vaginal bleeding and symptomatic anemia.  Initially presented to [facility].  Patient received first cycle of chemotherapy on 8/3/2021.  Reports having copious amounts of vaginal bleeding that evening and presented to [facility] for evaluation where she was found to have a hemoglobin of 6.3.  Patient was transfused 2 units of packed red blood cells and transferred to [facility] for GYN evaluation.  On presentation hemoglobin is 7.6 and vaginal bleeding appears to have subsided.  Hemoglobin stable.  Replete potassium.  I did leave a message for her GYN oncologist at [facility] Dr.Nimit Terrell and I am awaiting callback.  As long as vaginal bleeding is minimal and hemoglobin remains stable anticipate discharge tomorrow.

## 2021-08-04 NOTE — PROGRESS NOTES
Attending Hospitalist today is Dr Navas starting at 0700. Please call this Physician for orders, updates and questions.

## 2021-08-04 NOTE — ED NOTES
Pt states she has changed her pad twice through the night - will alert RN if any more bleeding. Feels she is continuously vaginally bleeding a small amount, ++ improved since she came in. Denies dizziness or pain.

## 2021-08-05 VITALS
HEIGHT: 62 IN | BODY MASS INDEX: 45.24 KG/M2 | HEART RATE: 81 BPM | RESPIRATION RATE: 17 BRPM | TEMPERATURE: 97.3 F | SYSTOLIC BLOOD PRESSURE: 110 MMHG | DIASTOLIC BLOOD PRESSURE: 53 MMHG | WEIGHT: 245.81 LBS | OXYGEN SATURATION: 99 %

## 2021-08-05 PROBLEM — N93.9 VAGINAL BLEEDING: Status: RESOLVED | Noted: 2021-08-04 | Resolved: 2021-08-05

## 2021-08-05 PROBLEM — D62 ACUTE BLOOD LOSS ANEMIA: Status: RESOLVED | Noted: 2021-08-04 | Resolved: 2021-08-05

## 2021-08-05 LAB
ANION GAP SERPL CALC-SCNC: 11 MMOL/L (ref 7–16)
BASOPHILS # BLD AUTO: 0.3 % (ref 0–1.8)
BASOPHILS # BLD: 0.01 K/UL (ref 0–0.12)
BUN SERPL-MCNC: 15 MG/DL (ref 8–22)
CALCIUM SERPL-MCNC: 8.6 MG/DL (ref 8.5–10.5)
CHLORIDE SERPL-SCNC: 101 MMOL/L (ref 96–112)
CO2 SERPL-SCNC: 22 MMOL/L (ref 20–33)
CREAT SERPL-MCNC: 0.65 MG/DL (ref 0.5–1.4)
EOSINOPHIL # BLD AUTO: 0.03 K/UL (ref 0–0.51)
EOSINOPHIL NFR BLD: 0.8 % (ref 0–6.9)
ERYTHROCYTE [DISTWIDTH] IN BLOOD BY AUTOMATED COUNT: 51.4 FL (ref 35.9–50)
GLUCOSE BLD-MCNC: 244 MG/DL (ref 65–99)
GLUCOSE SERPL-MCNC: 198 MG/DL (ref 65–99)
HCT VFR BLD AUTO: 24.8 % (ref 37–47)
HGB BLD-MCNC: 7.7 G/DL (ref 12–16)
IMM GRANULOCYTES # BLD AUTO: 0.01 K/UL (ref 0–0.11)
IMM GRANULOCYTES NFR BLD AUTO: 0.3 % (ref 0–0.9)
LYMPHOCYTES # BLD AUTO: 1.19 K/UL (ref 1–4.8)
LYMPHOCYTES NFR BLD: 32.3 % (ref 22–41)
MAGNESIUM SERPL-MCNC: 1.9 MG/DL (ref 1.5–2.5)
MCH RBC QN AUTO: 23.7 PG (ref 27–33)
MCHC RBC AUTO-ENTMCNC: 31 G/DL (ref 33.6–35)
MCV RBC AUTO: 76.3 FL (ref 81.4–97.8)
MONOCYTES # BLD AUTO: 0.11 K/UL (ref 0–0.85)
MONOCYTES NFR BLD AUTO: 3 % (ref 0–13.4)
NEUTROPHILS # BLD AUTO: 2.33 K/UL (ref 2–7.15)
NEUTROPHILS NFR BLD: 63.3 % (ref 44–72)
NRBC # BLD AUTO: 0 K/UL
NRBC BLD-RTO: 0 /100 WBC
PHOSPHATE SERPL-MCNC: 3.4 MG/DL (ref 2.5–4.5)
PLATELET # BLD AUTO: 145 K/UL (ref 164–446)
PMV BLD AUTO: 11.3 FL (ref 9–12.9)
POTASSIUM SERPL-SCNC: 4.2 MMOL/L (ref 3.6–5.5)
RBC # BLD AUTO: 3.25 M/UL (ref 4.2–5.4)
SODIUM SERPL-SCNC: 134 MMOL/L (ref 135–145)
WBC # BLD AUTO: 3.7 K/UL (ref 4.8–10.8)

## 2021-08-05 PROCEDURE — 83735 ASSAY OF MAGNESIUM: CPT

## 2021-08-05 PROCEDURE — 700102 HCHG RX REV CODE 250 W/ 637 OVERRIDE(OP): Performed by: STUDENT IN AN ORGANIZED HEALTH CARE EDUCATION/TRAINING PROGRAM

## 2021-08-05 PROCEDURE — 84100 ASSAY OF PHOSPHORUS: CPT

## 2021-08-05 PROCEDURE — 99217 PR OBSERVATION CARE DISCHARGE: CPT | Performed by: INTERNAL MEDICINE

## 2021-08-05 PROCEDURE — 700102 HCHG RX REV CODE 250 W/ 637 OVERRIDE(OP): Performed by: INTERNAL MEDICINE

## 2021-08-05 PROCEDURE — 85025 COMPLETE CBC W/AUTO DIFF WBC: CPT

## 2021-08-05 PROCEDURE — A9270 NON-COVERED ITEM OR SERVICE: HCPCS | Performed by: STUDENT IN AN ORGANIZED HEALTH CARE EDUCATION/TRAINING PROGRAM

## 2021-08-05 PROCEDURE — 80048 BASIC METABOLIC PNL TOTAL CA: CPT

## 2021-08-05 PROCEDURE — A9270 NON-COVERED ITEM OR SERVICE: HCPCS | Performed by: INTERNAL MEDICINE

## 2021-08-05 PROCEDURE — 82962 GLUCOSE BLOOD TEST: CPT

## 2021-08-05 PROCEDURE — G0378 HOSPITAL OBSERVATION PER HR: HCPCS

## 2021-08-05 PROCEDURE — 36415 COLL VENOUS BLD VENIPUNCTURE: CPT

## 2021-08-05 PROCEDURE — 96372 THER/PROPH/DIAG INJ SC/IM: CPT

## 2021-08-05 RX ORDER — POLYETHYLENE GLYCOL 3350 17 G/17G
17 POWDER, FOR SOLUTION ORAL
Qty: 10 EACH | Refills: 0 | Status: ON HOLD | OUTPATIENT
Start: 2021-08-05 | End: 2023-01-01

## 2021-08-05 RX ADMIN — INSULIN HUMAN 3 UNITS: 100 INJECTION, SOLUTION PARENTERAL at 08:29

## 2021-08-05 RX ADMIN — FERROUS SULFATE TAB 325 MG (65 MG ELEMENTAL FE) 325 MG: 325 (65 FE) TAB at 04:31

## 2021-08-05 RX ADMIN — LEVOTHYROXINE SODIUM 224 MCG: 0.11 TABLET ORAL at 04:31

## 2021-08-05 RX ADMIN — MAGNESIUM HYDROXIDE 30 ML: 400 SUSPENSION ORAL at 08:32

## 2021-08-05 RX ADMIN — DOCUSATE SODIUM 50 MG AND SENNOSIDES 8.6 MG 2 TABLET: 8.6; 5 TABLET, FILM COATED ORAL at 04:31

## 2021-08-05 RX ADMIN — ACETAMINOPHEN 650 MG: 325 TABLET, FILM COATED ORAL at 08:32

## 2021-08-05 RX ADMIN — Medication 400 MG: at 06:21

## 2021-08-05 ASSESSMENT — PAIN DESCRIPTION - PAIN TYPE: TYPE: ACUTE PAIN

## 2021-08-05 NOTE — PROGRESS NOTES
Bedside report received from day nurse. Assumed care of patient. Pt. resting comfortably without any sign of distress. A&Ox4, SR-ST on the monitor. POC reviewed and white board updated, Tele box on, Call light in reach, Bed locked in lowest position.

## 2021-08-05 NOTE — DIETARY
NUTRITION SERVICES: BMI - Pt with BMI >40 (=Body mass index is 44.96 kg/m².), Class III obesity. Weight taken via stand up scale and pt has no fluids recorded in I/O at this time. Weight loss counseling not appropriate in acute care setting. RECOMMEND - If appropriate at DC please refer to outpatient nutrition services for weight management.

## 2021-08-05 NOTE — DISCHARGE SUMMARY
Discharge Summary    CHIEF COMPLAINT ON ADMISSION  Chief Complaint   Patient presents with   • Sent by [facility] as EMS transfer from [facility] ani vaginal bleeding and HGB of 6.3, HCT 22. Pt has endometrial cancer and renal cell carcinoma currently receiving treatment at [facility]. 1st chemo treatment yesterday. Accepting ERP [facility], available for any ERP.       Reason for Admission  Vaginal bleeding, uncontrolled     Admission Date  8/3/2021    CODE STATUS  Full Code    HPI & HOSPITAL COURSE  Ms. Tena Chaudhry is a 60 y.o. female with history of metastatic endometrial carcinoma followed by [facility] GYN oncology Dr. Kristina Terrell, left renal cell carcinoma or left renal metastatic disease status post partial left nephrectomy 7/7/2021 who presented on 8/3/2021 with vaginal bleeding and symptomatic anemia.  Initially presented to [facility].  Patient received first cycle of chemotherapy on 8/3/2021.  Reports having copious amounts of vaginal bleeding that evening and presented to [facility] for evaluation where she was found to have a hemoglobin of 6.3.  Patient was transfused 2 units of packed red blood cells and transferred to [facility] for further care.  On presentation hemoglobin was 7.6 and remained stable requiring no further transfusions.  Vaginal bleeding subsided.  Outpatient aspirin was held.  I did leave a message for her GYN oncologist at [facility] Dr. Kristina Terrell and I am awaiting callback.    Patient has outpatient follow-up with Dr. Terrell on 8/11/2021.  She is medically cleared to discharge home.  I did write for outpatient repeat CBC to track tomorrow.  Counseled to return to the emergency room if vaginal bleeding recurred.  Follow-up with GYN oncology, PCP as outpatient      Therefore, she is discharged in fair and stable condition to home with close outpatient follow-up.    The patient met 2-midnight criteria for an inpatient stay at the time of  discharge.    Discharge Date  8/5/2021    FOLLOW UP ITEMS POST DISCHARGE  None    DISCHARGE DIAGNOSES  Principal Problem (Resolved):    Vaginal bleeding POA: Yes  Active Problems:    Class 3 severe obesity in adult (HCC) POA: Yes    At risk for obstructive sleep apnea POA: Yes    DM (diabetes mellitus) (HCC) POA: Yes    HLD (hyperlipidemia) POA: Yes    Hypothyroidism POA: Yes    Endometrial carcinoma (HCC) POA: Yes    Renal cell carcinoma of left kidney (HCC) POA: Yes    MISSY (iron deficiency anemia) POA: Yes  Resolved Problems:    Acute blood loss anemia POA: Yes      FOLLOW UP  Padmini Lunsford M.D.  [address] [facility] [address]  [phone]    In 2 weeks  As needed, If symptoms worsen      MEDICATIONS ON DISCHARGE     Medication List      START taking these medications      Instructions   polyethylene glycol/lytes 17 g Pack  Commonly known as: MIRALAX   Take 1 Packet by mouth 1 time a day as needed (if sennosides and docusate ineffective after 24 hours).  Dose: 17 g        CONTINUE taking these medications      Instructions   Crestor 40 MG tablet  Generic drug: rosuvastatin   Take 40 mg by mouth every day.  Dose: 40 mg     ferrous sulfate 325 (65 Fe) MG tablet   Take 325 mg by mouth every day.  Dose: 325 mg     glimepiride 4 MG Tabs  Commonly known as: AMARYL   Take 4 mg by mouth every day.  Dose: 4 mg     Lantus 100 UNIT/ML Soln  Generic drug: insulin glargine   Inject 64 Units under the skin every evening.  Dose: 64 Units     levothyroxine 112 MCG Tabs  Commonly known as: SYNTHROID   Take 224 mcg by mouth every morning on an empty stomach.  Dose: 224 mcg     metFORMIN 500 MG Tabs  Commonly known as: GLUCOPHAGE   Take 1,000 mg by mouth 2 times a day with meals. 2 tabs = 1000 mg  Dose: 1,000 mg     Victoza 18 MG/3ML Sopn  Generic drug: liraglutide   Inject 1.8 mg under the skin every day.  Dose: 1.8 mg        STOP taking these medications    aspirin EC 81 MG Tbec  Commonly known as: ECOTRIN             Allergies  No Known Allergies    DIET  Orders Placed This Encounter   Procedures   • Diet Order Diet: Consistent CHO (Diabetic); Second Modifier: (optional): Cardiac     Standing Status:   Standing     Number of Occurrences:   1     Order Specific Question:   Diet:     Answer:   Consistent CHO (Diabetic) [4]     Order Specific Question:   Second Modifier: (optional)     Answer:   Cardiac [6]       ACTIVITY  As tolerated.  Weight bearing as tolerated    CONSULTATIONS  None    PROCEDURES  None    LABORATORY  Lab Results   Component Value Date    SODIUM 134 (L) 08/05/2021    POTASSIUM 4.2 08/05/2021    CHLORIDE 101 08/05/2021    CO2 22 08/05/2021    GLUCOSE 198 (H) 08/05/2021    BUN 15 08/05/2021    CREATININE 0.65 08/05/2021        Lab Results   Component Value Date    WBC 3.7 (L) 08/05/2021    HEMOGLOBIN 7.7 (L) 08/05/2021    HEMATOCRIT 24.8 (L) 08/05/2021    PLATELETCT 145 (L) 08/05/2021        I discussed medications and side effects with the patient.  I discussed prognosis and importance of medical compliance with the patient.  I counseled the patient about diet, exercise, weight loss, smoking cessation, and life style modifications.  All questions and concerns have been addressed.  Total time of the discharge process was 38 minutes.

## 2021-08-05 NOTE — CARE PLAN
The patient is Stable - Low risk of patient condition declining or worsening         Progress made toward(s) clinical / shift goals:     Patient is not progressing towards the following goals:      Problem: Knowledge Deficit - Standard  Goal: Patient and family/care givers will demonstrate understanding of plan of care, disease process/condition, diagnostic tests and medications  Outcome: Progressing     Problem: Pain - Standard  Goal: Alleviation of pain or a reduction in pain to the patient’s comfort goal  Outcome: Progressing   Pt complaining of back pain. Tylenol administered. Offered to readjust pt and help her get back in bed. Pt wanting to stay in chair for a little longe.r     Problem: Discharge Barriers/Planning  Goal: Patient's continuum of care needs are met  Outcome: Progressing

## 2021-08-05 NOTE — PROGRESS NOTES
Oral and written discharge instructions reviewed. Patient has follow up appointment with PCP tomorrow anticipating repeat blood work, follow up appointment with gyn/onc this month as well. IV's removed. Patient prefers to get medications at [facility], reported to her that she may  miralax over the counter, and take as needed. Awaiting son to come  patient for discharge back to home.

## 2021-08-05 NOTE — DISCHARGE INSTRUCTIONS
Discharge Instructions per KHANG VargasO.    DIET: Diet Order Diet: Consistent CHO (Diabetic); Second Modifier: (optional): Cardiac    ACTIVITY: As tolerated    A proper diet that is low in grease, fat, and salt, along with 30 minutes of exercise per day will lead to weight loss, and better controlled blood sugar and blood pressure.    DIAGNOSIS: Vaginal bleeding    Follow up with your Primary Care Provider Padmini Lunsford M.D. as scheduled or sooner if your symptoms persist or worsen.  Return to Emergency Room for sever chest pain, shortness of breath, signs of a stroke, or any other emergencies.        Discharge Instructions    Discharged to home by car with relative. Discharged via wheelchair, hospital escort: Yes.  Special equipment needed: Not Applicable    Be sure to schedule a follow-up appointment with your primary care doctor or any specialists as instructed.     Discharge Plan:        I understand that a diet low in cholesterol, fat, and sodium is recommended for good health. Unless I have been given specific instructions below for another diet, I accept this instruction as my diet prescription.   Other diet: Healthy Low Fat Diet    Special Instructions: None    · Is patient discharged on Warfarin / Coumadin?   No     Depression / Suicide Risk    As you are discharged from this [facility] facility, it is important to learn how to keep safe from harming yourself.    Recognize the warning signs:  · Abrupt changes in personality, positive or negative- including increase in energy   · Giving away possessions  · Change in eating patterns- significant weight changes-  positive or negative  · Change in sleeping patterns- unable to sleep or sleeping all the time   · Unwillingness or inability to communicate  · Depression  · Unusual sadness, discouragement and loneliness  · Talk of wanting to die  · Neglect of personal appearance   · Rebelliousness- reckless behavior  · Withdrawal from  people/activities they love  · Confusion- inability to concentrate     If you or a loved one observes any of these behaviors or has concerns about self-harm, here's what you can do:  · Talk about it- your feelings and reasons for harming yourself  · Remove any means that you might use to hurt yourself (examples: pills, rope, extension cords, firearm)  · Get professional help from the community (Mental Health, Substance Abuse, psychological counseling)  · Do not be alone:Call your Safe Contact- someone whom you trust who will be there for you.  · Call your local CRISIS HOTLINE 157-1704 or 083-988-4108  · Call your local Children's Mobile Crisis Response Team [place] (894) 511-4926 or www.[employer]  · Call the toll free National Suicide Prevention Hotlines   · National Suicide Prevention Lifeline 843-367-JXJY (8201)  · National Hope Line Network 800-SUICIDE (606-5772)

## 2021-08-06 LAB — HAPTOGLOB SERPL-MCNC: 207 MG/DL (ref 30–200)

## 2023-01-01 ENCOUNTER — HOSPITAL ENCOUNTER (INPATIENT)
Facility: MEDICAL CENTER | Age: 63
LOS: 6 days | DRG: 871 | End: 2023-07-11
Attending: EMERGENCY MEDICINE | Admitting: STUDENT IN AN ORGANIZED HEALTH CARE EDUCATION/TRAINING PROGRAM
Payer: COMMERCIAL

## 2023-01-01 ENCOUNTER — APPOINTMENT (OUTPATIENT)
Dept: RADIOLOGY | Facility: MEDICAL CENTER | Age: 63
DRG: 871 | End: 2023-01-01
Attending: STUDENT IN AN ORGANIZED HEALTH CARE EDUCATION/TRAINING PROGRAM
Payer: COMMERCIAL

## 2023-01-01 ENCOUNTER — APPOINTMENT (OUTPATIENT)
Dept: RADIOLOGY | Facility: MEDICAL CENTER | Age: 63
DRG: 871 | End: 2023-01-01
Attending: INTERNAL MEDICINE
Payer: COMMERCIAL

## 2023-01-01 ENCOUNTER — APPOINTMENT (OUTPATIENT)
Dept: RADIOLOGY | Facility: MEDICAL CENTER | Age: 63
DRG: 871 | End: 2023-01-01
Attending: HOSPITALIST
Payer: COMMERCIAL

## 2023-01-01 ENCOUNTER — HOSPITAL ENCOUNTER (OUTPATIENT)
Dept: RADIOLOGY | Facility: MEDICAL CENTER | Age: 63
End: 2023-07-06
Payer: COMMERCIAL

## 2023-01-01 VITALS
TEMPERATURE: 99 F | BODY MASS INDEX: 48.95 KG/M2 | WEIGHT: 276.24 LBS | OXYGEN SATURATION: 100 % | HEART RATE: 109 BPM | HEIGHT: 63 IN | SYSTOLIC BLOOD PRESSURE: 73 MMHG | RESPIRATION RATE: 23 BRPM | DIASTOLIC BLOOD PRESSURE: 43 MMHG

## 2023-01-01 DIAGNOSIS — J96.02 ACUTE RESPIRATORY FAILURE WITH HYPOXIA AND HYPERCAPNIA (HCC): ICD-10-CM

## 2023-01-01 DIAGNOSIS — J96.01 ACUTE RESPIRATORY FAILURE WITH HYPOXIA AND HYPERCAPNIA (HCC): ICD-10-CM

## 2023-01-01 LAB
ABO GROUP BLD: ABNORMAL
ACANTHOCYTES BLD QL SMEAR: NORMAL
ALBUMIN SERPL BCP-MCNC: 1.7 G/DL (ref 3.2–4.9)
ALBUMIN SERPL BCP-MCNC: 1.9 G/DL (ref 3.2–4.9)
ALBUMIN SERPL BCP-MCNC: 1.9 G/DL (ref 3.2–4.9)
ALBUMIN SERPL BCP-MCNC: 2 G/DL (ref 3.2–4.9)
ALBUMIN SERPL BCP-MCNC: 2.1 G/DL (ref 3.2–4.9)
ALBUMIN SERPL BCP-MCNC: 2.1 G/DL (ref 3.2–4.9)
ALBUMIN SERPL BCP-MCNC: 2.3 G/DL (ref 3.2–4.9)
ALBUMIN SERPL BCP-MCNC: 2.5 G/DL (ref 3.2–4.9)
ALBUMIN/GLOB SERPL: 0.4 G/DL
ALBUMIN/GLOB SERPL: 0.5 G/DL
ALBUMIN/GLOB SERPL: 0.6 G/DL
ALP SERPL-CCNC: 656 U/L (ref 30–99)
ALP SERPL-CCNC: 705 U/L (ref 30–99)
ALP SERPL-CCNC: 742 U/L (ref 30–99)
ALP SERPL-CCNC: 806 U/L (ref 30–99)
ALP SERPL-CCNC: 840 U/L (ref 30–99)
ALP SERPL-CCNC: 841 U/L (ref 30–99)
ALP SERPL-CCNC: 983 U/L (ref 30–99)
ALP SERPL-CCNC: 984 U/L (ref 30–99)
ALT SERPL-CCNC: 49 U/L (ref 2–50)
ALT SERPL-CCNC: 52 U/L (ref 2–50)
ALT SERPL-CCNC: 53 U/L (ref 2–50)
ALT SERPL-CCNC: 57 U/L (ref 2–50)
ALT SERPL-CCNC: 61 U/L (ref 2–50)
ALT SERPL-CCNC: 62 U/L (ref 2–50)
ALT SERPL-CCNC: 65 U/L (ref 2–50)
ALT SERPL-CCNC: 91 U/L (ref 2–50)
ANION GAP SERPL CALC-SCNC: 19 MMOL/L (ref 7–16)
ANION GAP SERPL CALC-SCNC: 20 MMOL/L (ref 7–16)
ANION GAP SERPL CALC-SCNC: 20 MMOL/L (ref 7–16)
ANION GAP SERPL CALC-SCNC: 21 MMOL/L (ref 7–16)
ANION GAP SERPL CALC-SCNC: 22 MMOL/L (ref 7–16)
ANION GAP SERPL CALC-SCNC: 24 MMOL/L (ref 7–16)
ANION GAP SERPL CALC-SCNC: 25 MMOL/L (ref 7–16)
ANION GAP SERPL CALC-SCNC: 28 MMOL/L (ref 7–16)
ANISOCYTOSIS BLD QL SMEAR: ABNORMAL
APPEARANCE UR: ABNORMAL
ARTERIAL PATENCY WRIST A: ABNORMAL
AST SERPL-CCNC: 144 U/L (ref 12–45)
AST SERPL-CCNC: 147 U/L (ref 12–45)
AST SERPL-CCNC: 217 U/L (ref 12–45)
AST SERPL-CCNC: 324 U/L (ref 12–45)
AST SERPL-CCNC: 388 U/L (ref 12–45)
AST SERPL-CCNC: 397 U/L (ref 12–45)
AST SERPL-CCNC: 478 U/L (ref 12–45)
AST SERPL-CCNC: 771 U/L (ref 12–45)
BACTERIA #/AREA URNS HPF: NEGATIVE /HPF
BACTERIA BLD CULT: NORMAL
BACTERIA BLD CULT: NORMAL
BARCODED ABORH UBTYP: 5100
BARCODED PRD CODE UBPRD: NORMAL
BARCODED UNIT NUM UBUNT: NORMAL
BASE EXCESS BLDA CALC-SCNC: -13 MMOL/L (ref -4–3)
BASE EXCESS BLDA CALC-SCNC: -18 MMOL/L (ref -4–3)
BASE EXCESS BLDA CALC-SCNC: -19 MMOL/L (ref -4–3)
BASOPHILS # BLD AUTO: 0 % (ref 0–1.8)
BASOPHILS # BLD AUTO: 0.4 % (ref 0–1.8)
BASOPHILS # BLD: 0 K/UL (ref 0–0.12)
BASOPHILS # BLD: 0.07 K/UL (ref 0–0.12)
BILIRUB SERPL-MCNC: 1.4 MG/DL (ref 0.1–1.5)
BILIRUB SERPL-MCNC: 1.5 MG/DL (ref 0.1–1.5)
BILIRUB SERPL-MCNC: 1.6 MG/DL (ref 0.1–1.5)
BILIRUB SERPL-MCNC: 1.6 MG/DL (ref 0.1–1.5)
BILIRUB SERPL-MCNC: 1.8 MG/DL (ref 0.1–1.5)
BILIRUB SERPL-MCNC: 1.9 MG/DL (ref 0.1–1.5)
BILIRUB SERPL-MCNC: 1.9 MG/DL (ref 0.1–1.5)
BILIRUB SERPL-MCNC: 2 MG/DL (ref 0.1–1.5)
BILIRUB UR QL STRIP.AUTO: ABNORMAL
BLD GP AB INVEST PLASRBC-IMP: ABNORMAL
BLD GP AB INVEST PLASRBC-IMP: ABNORMAL
BLD GP AB SCN SERPL QL: ABNORMAL
BODY TEMPERATURE: 35.9 CENTIGRADE
BODY TEMPERATURE: ABNORMAL DEGREES
BODY TEMPERATURE: ABNORMAL DEGREES
BREATHS SETTING VENT: 20
BREATHS SETTING VENT: 22
BUN SERPL-MCNC: 85 MG/DL (ref 8–22)
BUN SERPL-MCNC: 85 MG/DL (ref 8–22)
BUN SERPL-MCNC: 88 MG/DL (ref 8–22)
BUN SERPL-MCNC: 92 MG/DL (ref 8–22)
BUN SERPL-MCNC: 93 MG/DL (ref 8–22)
BUN SERPL-MCNC: 94 MG/DL (ref 8–22)
BUN SERPL-MCNC: 95 MG/DL (ref 8–22)
BUN SERPL-MCNC: 97 MG/DL (ref 8–22)
BURR CELLS BLD QL SMEAR: NORMAL
BURR CELLS BLD QL SMEAR: NORMAL
C DIFF DNA SPEC QL NAA+PROBE: NEGATIVE
C DIFF TOX GENS STL QL NAA+PROBE: NEGATIVE
CALCIUM ALBUM COR SERPL-MCNC: 10.3 MG/DL (ref 8.5–10.5)
CALCIUM ALBUM COR SERPL-MCNC: 10.7 MG/DL (ref 8.5–10.5)
CALCIUM ALBUM COR SERPL-MCNC: 10.8 MG/DL (ref 8.5–10.5)
CALCIUM ALBUM COR SERPL-MCNC: 10.9 MG/DL (ref 8.5–10.5)
CALCIUM ALBUM COR SERPL-MCNC: 9.6 MG/DL (ref 8.5–10.5)
CALCIUM ALBUM COR SERPL-MCNC: 9.7 MG/DL (ref 8.5–10.5)
CALCIUM ALBUM COR SERPL-MCNC: 9.8 MG/DL (ref 8.5–10.5)
CALCIUM ALBUM COR SERPL-MCNC: 9.9 MG/DL (ref 8.5–10.5)
CALCIUM SERPL-MCNC: 7.8 MG/DL (ref 8.5–10.5)
CALCIUM SERPL-MCNC: 8.1 MG/DL (ref 8.5–10.5)
CALCIUM SERPL-MCNC: 8.1 MG/DL (ref 8.5–10.5)
CALCIUM SERPL-MCNC: 8.2 MG/DL (ref 8.5–10.5)
CALCIUM SERPL-MCNC: 8.8 MG/DL (ref 8.5–10.5)
CALCIUM SERPL-MCNC: 9.2 MG/DL (ref 8.5–10.5)
CALCIUM SERPL-MCNC: 9.5 MG/DL (ref 8.5–10.5)
CALCIUM SERPL-MCNC: 9.6 MG/DL (ref 8.5–10.5)
CFT BLD TEG: 10.2 MIN (ref 4.6–9.1)
CFT P HPASE BLD TEG: 9.7 MIN (ref 4.3–8.3)
CHLORIDE SERPL-SCNC: 86 MMOL/L (ref 96–112)
CHLORIDE SERPL-SCNC: 89 MMOL/L (ref 96–112)
CHLORIDE SERPL-SCNC: 91 MMOL/L (ref 96–112)
CHLORIDE SERPL-SCNC: 91 MMOL/L (ref 96–112)
CHLORIDE SERPL-SCNC: 94 MMOL/L (ref 96–112)
CLOT ANGLE BLD TEG: 75.6 DEGREES (ref 63–78)
CO2 BLDA-SCNC: 10 MMOL/L (ref 20–33)
CO2 BLDA-SCNC: 12 MMOL/L (ref 20–33)
CO2 SERPL-SCNC: 10 MMOL/L (ref 20–33)
CO2 SERPL-SCNC: 10 MMOL/L (ref 20–33)
CO2 SERPL-SCNC: 11 MMOL/L (ref 20–33)
CO2 SERPL-SCNC: 11 MMOL/L (ref 20–33)
CO2 SERPL-SCNC: 12 MMOL/L (ref 20–33)
CO2 SERPL-SCNC: 12 MMOL/L (ref 20–33)
CO2 SERPL-SCNC: 13 MMOL/L (ref 20–33)
CO2 SERPL-SCNC: 14 MMOL/L (ref 20–33)
COLOR UR: ABNORMAL
COMMENT NL1176: NORMAL
COMMENT NL1176: NORMAL
COMPONENT R 8504R: NORMAL
CORTIS SERPL-MCNC: 20.2 UG/DL (ref 0–23)
CORTIS SERPL-MCNC: 25.7 UG/DL (ref 0–23)
CREAT SERPL-MCNC: 5.07 MG/DL (ref 0.5–1.4)
CREAT SERPL-MCNC: 5.09 MG/DL (ref 0.5–1.4)
CREAT SERPL-MCNC: 5.3 MG/DL (ref 0.5–1.4)
CREAT SERPL-MCNC: 5.8 MG/DL (ref 0.5–1.4)
CREAT SERPL-MCNC: 6.05 MG/DL (ref 0.5–1.4)
CREAT SERPL-MCNC: 6.19 MG/DL (ref 0.5–1.4)
CREAT SERPL-MCNC: 6.45 MG/DL (ref 0.5–1.4)
CREAT SERPL-MCNC: 6.49 MG/DL (ref 0.5–1.4)
CRP SERPL HS-MCNC: 34.26 MG/DL (ref 0–0.75)
CRP SERPL HS-MCNC: 35.18 MG/DL (ref 0–0.75)
CT.EXTRINSIC BLD ROTEM: 1.1 MIN (ref 0.8–2.1)
DAT C3D-SP REAG RBC QL: ABNORMAL
DAT IGG-SP REAG RBC QL: ABNORMAL
DELSYS IDSYS: ABNORMAL
DELSYS IDSYS: ABNORMAL
EKG IMPRESSION: NORMAL
END TIDAL CARBON DIOXIDE IECO2: 15 MMHG
END TIDAL CARBON DIOXIDE IECO2: 27 MMHG
EOSINOPHIL # BLD AUTO: 0 K/UL (ref 0–0.51)
EOSINOPHIL # BLD AUTO: 0.02 K/UL (ref 0–0.51)
EOSINOPHIL # BLD AUTO: 0.11 K/UL (ref 0–0.51)
EOSINOPHIL NFR BLD: 0 % (ref 0–6.9)
EOSINOPHIL NFR BLD: 0.1 % (ref 0–6.9)
EOSINOPHIL NFR BLD: 0.9 % (ref 0–6.9)
EPI CELLS #/AREA URNS HPF: ABNORMAL /HPF
ERYTHROCYTE [DISTWIDTH] IN BLOOD BY AUTOMATED COUNT: 64.4 FL (ref 35.9–50)
ERYTHROCYTE [DISTWIDTH] IN BLOOD BY AUTOMATED COUNT: 65.1 FL (ref 35.9–50)
ERYTHROCYTE [DISTWIDTH] IN BLOOD BY AUTOMATED COUNT: 66.6 FL (ref 35.9–50)
ERYTHROCYTE [DISTWIDTH] IN BLOOD BY AUTOMATED COUNT: 66.9 FL (ref 35.9–50)
ERYTHROCYTE [DISTWIDTH] IN BLOOD BY AUTOMATED COUNT: 67.5 FL (ref 35.9–50)
ERYTHROCYTE [DISTWIDTH] IN BLOOD BY AUTOMATED COUNT: 68 FL (ref 35.9–50)
ERYTHROCYTE [DISTWIDTH] IN BLOOD BY AUTOMATED COUNT: 68.6 FL (ref 35.9–50)
ERYTHROCYTE [DISTWIDTH] IN BLOOD BY AUTOMATED COUNT: 69.3 FL (ref 35.9–50)
EST. AVERAGE GLUCOSE BLD GHB EST-MCNC: 140 MG/DL
FERRITIN SERPL-MCNC: 4858 NG/ML (ref 10–291)
FIBRINOGEN PPP-MCNC: 674 MG/DL (ref 215–460)
GFR SERPLBLD CREATININE-BSD FMLA CKD-EPI: 7 ML/MIN/1.73 M 2
GFR SERPLBLD CREATININE-BSD FMLA CKD-EPI: 8 ML/MIN/1.73 M 2
GFR SERPLBLD CREATININE-BSD FMLA CKD-EPI: 9 ML/MIN/1.73 M 2
GLOBULIN SER CALC-MCNC: 4 G/DL (ref 1.9–3.5)
GLOBULIN SER CALC-MCNC: 4.1 G/DL (ref 1.9–3.5)
GLOBULIN SER CALC-MCNC: 4.3 G/DL (ref 1.9–3.5)
GLOBULIN SER CALC-MCNC: 4.4 G/DL (ref 1.9–3.5)
GLOBULIN SER CALC-MCNC: 4.8 G/DL (ref 1.9–3.5)
GLUCOSE BLD STRIP.AUTO-MCNC: 100 MG/DL (ref 65–99)
GLUCOSE BLD STRIP.AUTO-MCNC: 102 MG/DL (ref 65–99)
GLUCOSE BLD STRIP.AUTO-MCNC: 102 MG/DL (ref 65–99)
GLUCOSE BLD STRIP.AUTO-MCNC: 103 MG/DL (ref 65–99)
GLUCOSE BLD STRIP.AUTO-MCNC: 105 MG/DL (ref 65–99)
GLUCOSE BLD STRIP.AUTO-MCNC: 108 MG/DL (ref 65–99)
GLUCOSE BLD STRIP.AUTO-MCNC: 109 MG/DL (ref 65–99)
GLUCOSE BLD STRIP.AUTO-MCNC: 109 MG/DL (ref 65–99)
GLUCOSE BLD STRIP.AUTO-MCNC: 115 MG/DL (ref 65–99)
GLUCOSE BLD STRIP.AUTO-MCNC: 127 MG/DL (ref 65–99)
GLUCOSE BLD STRIP.AUTO-MCNC: 169 MG/DL (ref 65–99)
GLUCOSE BLD STRIP.AUTO-MCNC: 181 MG/DL (ref 65–99)
GLUCOSE BLD STRIP.AUTO-MCNC: 57 MG/DL (ref 65–99)
GLUCOSE BLD STRIP.AUTO-MCNC: 67 MG/DL (ref 65–99)
GLUCOSE BLD STRIP.AUTO-MCNC: 68 MG/DL (ref 65–99)
GLUCOSE BLD STRIP.AUTO-MCNC: 76 MG/DL (ref 65–99)
GLUCOSE BLD STRIP.AUTO-MCNC: 79 MG/DL (ref 65–99)
GLUCOSE BLD STRIP.AUTO-MCNC: 83 MG/DL (ref 65–99)
GLUCOSE BLD STRIP.AUTO-MCNC: 84 MG/DL (ref 65–99)
GLUCOSE BLD STRIP.AUTO-MCNC: 84 MG/DL (ref 65–99)
GLUCOSE BLD STRIP.AUTO-MCNC: 87 MG/DL (ref 65–99)
GLUCOSE BLD STRIP.AUTO-MCNC: 87 MG/DL (ref 65–99)
GLUCOSE BLD STRIP.AUTO-MCNC: 89 MG/DL (ref 65–99)
GLUCOSE BLD STRIP.AUTO-MCNC: 89 MG/DL (ref 65–99)
GLUCOSE BLD STRIP.AUTO-MCNC: 90 MG/DL (ref 65–99)
GLUCOSE BLD STRIP.AUTO-MCNC: 99 MG/DL (ref 65–99)
GLUCOSE SERPL-MCNC: 100 MG/DL (ref 65–99)
GLUCOSE SERPL-MCNC: 102 MG/DL (ref 65–99)
GLUCOSE SERPL-MCNC: 105 MG/DL (ref 65–99)
GLUCOSE SERPL-MCNC: 139 MG/DL (ref 65–99)
GLUCOSE SERPL-MCNC: 185 MG/DL (ref 65–99)
GLUCOSE SERPL-MCNC: 81 MG/DL (ref 65–99)
GLUCOSE SERPL-MCNC: 87 MG/DL (ref 65–99)
GLUCOSE SERPL-MCNC: 94 MG/DL (ref 65–99)
GLUCOSE UR STRIP.AUTO-MCNC: NEGATIVE MG/DL
HBA1C MFR BLD: 6.5 % (ref 4–5.6)
HCO3 BLDA-SCNC: 10.6 MMOL/L (ref 17–25)
HCO3 BLDA-SCNC: 12 MMOL/L (ref 17–25)
HCO3 BLDA-SCNC: 9.2 MMOL/L (ref 17–25)
HCT VFR BLD AUTO: 19.6 % (ref 37–47)
HCT VFR BLD AUTO: 22.9 % (ref 37–47)
HCT VFR BLD AUTO: 23.7 % (ref 37–47)
HCT VFR BLD AUTO: 23.8 % (ref 37–47)
HCT VFR BLD AUTO: 23.9 % (ref 37–47)
HCT VFR BLD AUTO: 24.3 % (ref 37–47)
HCT VFR BLD AUTO: 26.4 % (ref 37–47)
HCT VFR BLD AUTO: 27.1 % (ref 37–47)
HEMOCCULT SP1 STL QL: NEGATIVE
HGB BLD-MCNC: 5.9 G/DL (ref 12–16)
HGB BLD-MCNC: 6.9 G/DL (ref 12–16)
HGB BLD-MCNC: 7.2 G/DL (ref 12–16)
HGB BLD-MCNC: 7.4 G/DL (ref 12–16)
HGB BLD-MCNC: 7.5 G/DL (ref 12–16)
HGB BLD-MCNC: 7.6 G/DL (ref 12–16)
HGB BLD-MCNC: 8 G/DL (ref 12–16)
HGB BLD-MCNC: 8.2 G/DL (ref 12–16)
HOROWITZ INDEX BLDA+IHG-RTO: 233 MM[HG]
HOROWITZ INDEX BLDA+IHG-RTO: 238 MM[HG]
HYALINE CASTS #/AREA URNS LPF: ABNORMAL /LPF
HYPOCHROMIA BLD QL SMEAR: ABNORMAL
IMM GRANULOCYTES # BLD AUTO: 1.53 K/UL (ref 0–0.11)
IMM GRANULOCYTES NFR BLD AUTO: 9.2 % (ref 0–0.9)
INHALED O2 FLOW RATE: ABNORMAL L/MIN
INR PPP: 1.95 (ref 0.87–1.13)
INR PPP: 2.05 (ref 0.87–1.13)
IRON SATN MFR SERPL: 19 % (ref 15–55)
IRON SERPL-MCNC: 16 UG/DL (ref 40–170)
KETONES UR STRIP.AUTO-MCNC: ABNORMAL MG/DL
LACTATE SERPL-SCNC: 2.9 MMOL/L (ref 0.5–2)
LACTATE SERPL-SCNC: 3 MMOL/L (ref 0.5–2)
LACTATE SERPL-SCNC: 3.1 MMOL/L (ref 0.5–2)
LACTATE SERPL-SCNC: 3.2 MMOL/L (ref 0.5–2)
LACTATE SERPL-SCNC: 4 MMOL/L (ref 0.5–2)
LACTATE SERPL-SCNC: 4.8 MMOL/L (ref 0.5–2)
LACTATE SERPL-SCNC: 5.5 MMOL/L (ref 0.5–2)
LEUKOCYTE ESTERASE UR QL STRIP.AUTO: ABNORMAL
LYMPHOCYTES # BLD AUTO: 0.37 K/UL (ref 1–4.8)
LYMPHOCYTES # BLD AUTO: 0.71 K/UL (ref 1–4.8)
LYMPHOCYTES # BLD AUTO: 0.73 K/UL (ref 1–4.8)
LYMPHOCYTES # BLD AUTO: 0.74 K/UL (ref 1–4.8)
LYMPHOCYTES # BLD AUTO: 0.75 K/UL (ref 1–4.8)
LYMPHOCYTES # BLD AUTO: 0.93 K/UL (ref 1–4.8)
LYMPHOCYTES NFR BLD: 2.6 % (ref 22–41)
LYMPHOCYTES NFR BLD: 3.4 % (ref 22–41)
LYMPHOCYTES NFR BLD: 3.4 % (ref 22–41)
LYMPHOCYTES NFR BLD: 4.3 % (ref 22–41)
LYMPHOCYTES NFR BLD: 5.6 % (ref 22–41)
LYMPHOCYTES NFR BLD: 6.1 % (ref 22–41)
MACROCYTES BLD QL SMEAR: ABNORMAL
MAGNESIUM SERPL-MCNC: 2.1 MG/DL (ref 1.5–2.5)
MAGNESIUM SERPL-MCNC: 2.1 MG/DL (ref 1.5–2.5)
MAGNESIUM SERPL-MCNC: 2.3 MG/DL (ref 1.5–2.5)
MANUAL DIFF BLD: NORMAL
MCF BLD TEG: 68.7 MM (ref 52–69)
MCF.PLATELET INHIB BLD ROTEM: 42.1 MM (ref 15–32)
MCH RBC QN AUTO: 26.6 PG (ref 27–33)
MCH RBC QN AUTO: 26.7 PG (ref 27–33)
MCH RBC QN AUTO: 26.7 PG (ref 27–33)
MCH RBC QN AUTO: 26.8 PG (ref 27–33)
MCH RBC QN AUTO: 26.8 PG (ref 27–33)
MCH RBC QN AUTO: 27 PG (ref 27–33)
MCH RBC QN AUTO: 27 PG (ref 27–33)
MCH RBC QN AUTO: 27.2 PG (ref 27–33)
MCHC RBC AUTO-ENTMCNC: 30.1 G/DL (ref 32.2–35.5)
MCHC RBC AUTO-ENTMCNC: 30.1 G/DL (ref 32.2–35.5)
MCHC RBC AUTO-ENTMCNC: 30.3 G/DL (ref 32.2–35.5)
MCHC RBC AUTO-ENTMCNC: 30.3 G/DL (ref 32.2–35.5)
MCHC RBC AUTO-ENTMCNC: 30.4 G/DL (ref 32.2–35.5)
MCHC RBC AUTO-ENTMCNC: 31.1 G/DL (ref 32.2–35.5)
MCHC RBC AUTO-ENTMCNC: 31.3 G/DL (ref 32.2–35.5)
MCHC RBC AUTO-ENTMCNC: 31.4 G/DL (ref 32.2–35.5)
MCV RBC AUTO: 85.4 FL (ref 81.4–97.8)
MCV RBC AUTO: 86.2 FL (ref 81.4–97.8)
MCV RBC AUTO: 87.5 FL (ref 81.4–97.8)
MCV RBC AUTO: 87.5 FL (ref 81.4–97.8)
MCV RBC AUTO: 88 FL (ref 81.4–97.8)
MCV RBC AUTO: 88.7 FL (ref 81.4–97.8)
MCV RBC AUTO: 89.1 FL (ref 81.4–97.8)
MCV RBC AUTO: 89.1 FL (ref 81.4–97.8)
METAMYELOCYTES NFR BLD MANUAL: 0.9 %
METAMYELOCYTES NFR BLD MANUAL: 0.9 %
METAMYELOCYTES NFR BLD MANUAL: 2.5 %
MICRO URNS: ABNORMAL
MICROCYTES BLD QL SMEAR: ABNORMAL
MODE IMODE: ABNORMAL
MODE IMODE: ABNORMAL
MONOCYTES # BLD AUTO: 0.11 K/UL (ref 0–0.85)
MONOCYTES # BLD AUTO: 0.17 K/UL (ref 0–0.85)
MONOCYTES # BLD AUTO: 0.37 K/UL (ref 0–0.85)
MONOCYTES # BLD AUTO: 0.38 K/UL (ref 0–0.85)
MONOCYTES # BLD AUTO: 0.43 K/UL (ref 0–0.85)
MONOCYTES # BLD AUTO: 0.65 K/UL (ref 0–0.85)
MONOCYTES NFR BLD AUTO: 0.8 % (ref 0–13.4)
MONOCYTES NFR BLD AUTO: 0.9 % (ref 0–13.4)
MONOCYTES NFR BLD AUTO: 1.7 % (ref 0–13.4)
MONOCYTES NFR BLD AUTO: 2.6 % (ref 0–13.4)
MONOCYTES NFR BLD AUTO: 2.6 % (ref 0–13.4)
MONOCYTES NFR BLD AUTO: 3.9 % (ref 0–13.4)
MORPHOLOGY BLD-IMP: NORMAL
MYELOCYTES NFR BLD MANUAL: 0.8 %
MYELOCYTES NFR BLD MANUAL: 0.8 %
MYELOCYTES NFR BLD MANUAL: 0.9 %
MYELOCYTES NFR BLD MANUAL: 1.7 %
MYELOCYTES NFR BLD MANUAL: 1.7 %
NEUTROPHILS # BLD AUTO: 10.74 K/UL (ref 1.82–7.42)
NEUTROPHILS # BLD AUTO: 13.22 K/UL (ref 1.82–7.42)
NEUTROPHILS # BLD AUTO: 13.38 K/UL (ref 1.82–7.42)
NEUTROPHILS # BLD AUTO: 15.02 K/UL (ref 1.82–7.42)
NEUTROPHILS # BLD AUTO: 20.24 K/UL (ref 1.82–7.42)
NEUTROPHILS # BLD AUTO: 20.59 K/UL (ref 1.82–7.42)
NEUTROPHILS NFR BLD: 80.8 % (ref 44–72)
NEUTROPHILS NFR BLD: 89.5 % (ref 44–72)
NEUTROPHILS NFR BLD: 90.5 % (ref 44–72)
NEUTROPHILS NFR BLD: 91.4 % (ref 44–72)
NEUTROPHILS NFR BLD: 91.6 % (ref 44–72)
NEUTROPHILS NFR BLD: 94.9 % (ref 44–72)
NEUTS BAND NFR BLD MANUAL: 1.7 % (ref 0–10)
NITRITE UR QL STRIP.AUTO: NEGATIVE
NRBC # BLD AUTO: 0.02 K/UL
NRBC # BLD AUTO: 0.04 K/UL
NRBC # BLD AUTO: 0.17 K/UL
NRBC # BLD AUTO: 0.3 K/UL
NRBC # BLD AUTO: 1.02 K/UL
NRBC # BLD AUTO: 1.21 K/UL
NRBC BLD-RTO: 0.2 /100 WBC (ref 0–0.2)
NRBC BLD-RTO: 0.3 /100 WBC (ref 0–0.2)
NRBC BLD-RTO: 1 /100 WBC (ref 0–0.2)
NRBC BLD-RTO: 1.8 /100 WBC (ref 0–0.2)
NRBC BLD-RTO: 4.6 /100 WBC (ref 0–0.2)
NRBC BLD-RTO: 5.6 /100 WBC (ref 0–0.2)
NT-PROBNP SERPL IA-MCNC: 4664 PG/ML (ref 0–125)
O2/TOTAL GAS SETTING VFR VENT: 40 %
O2/TOTAL GAS SETTING VFR VENT: 60 %
OSMOLALITY SERPL: 296 MOSM/KG H2O (ref 278–298)
OVALOCYTES BLD QL SMEAR: NORMAL
PCO2 BLDA: 25.2 MMHG (ref 26–37)
PCO2 BLDA: 30.8 MMHG (ref 26–37)
PCO2 BLDA: 34.6 MMHG (ref 26–37)
PCO2 TEMP ADJ BLDA: 24 MMHG (ref 26–37)
PCO2 TEMP ADJ BLDA: 31.9 MMHG (ref 26–37)
PEEP END EXPIRATORY PRESSURE IPEEP: 8 CMH20
PEEP END EXPIRATORY PRESSURE IPEEP: 8 CMH20
PH BLDA: 7.08 [PH] (ref 7.4–7.5)
PH BLDA: 7.09 [PH] (ref 7.4–7.5)
PH BLDA: 7.3 [PH] (ref 7.4–7.5)
PH TEMP ADJ BLDA: 7.12 [PH] (ref 7.4–7.5)
PH TEMP ADJ BLDA: 7.31 [PH] (ref 7.4–7.5)
PH UR STRIP.AUTO: 5 [PH] (ref 5–8)
PHOSPHATE SERPL-MCNC: 4.1 MG/DL (ref 2.5–4.5)
PHOSPHATE SERPL-MCNC: 4.7 MG/DL (ref 2.5–4.5)
PHOSPHATE SERPL-MCNC: 9.4 MG/DL (ref 2.5–4.5)
PLATELET # BLD AUTO: 64 K/UL (ref 164–446)
PLATELET # BLD AUTO: 71 K/UL (ref 164–446)
PLATELET # BLD AUTO: 73 K/UL (ref 164–446)
PLATELET # BLD AUTO: 76 K/UL (ref 164–446)
PLATELET # BLD AUTO: 76 K/UL (ref 164–446)
PLATELET # BLD AUTO: 77 K/UL (ref 164–446)
PLATELET # BLD AUTO: 85 K/UL (ref 164–446)
PLATELET # BLD AUTO: 90 K/UL (ref 164–446)
PLATELET BLD QL SMEAR: NORMAL
PLATELETS.RETICULATED NFR BLD AUTO: 10.5 % (ref 0.6–13.1)
PLATELETS.RETICULATED NFR BLD AUTO: 11.3 % (ref 0.6–13.1)
PLATELETS.RETICULATED NFR BLD AUTO: 6.6 % (ref 0.6–13.1)
PLATELETS.RETICULATED NFR BLD AUTO: 8.1 % (ref 0.6–13.1)
PLATELETS.RETICULATED NFR BLD AUTO: 8.9 % (ref 0.6–13.1)
PLATELETS.RETICULATED NFR BLD AUTO: 8.9 % (ref 0.6–13.1)
PLATELETS.RETICULATED NFR BLD AUTO: 9.1 % (ref 0.6–13.1)
PLATELETS.RETICULATED NFR BLD AUTO: 9.5 % (ref 0.6–13.1)
PMV BLD AUTO: 10.6 FL (ref 9–12.9)
PMV BLD AUTO: 10.9 FL (ref 9–12.9)
PMV BLD AUTO: 11 FL (ref 9–12.9)
PMV BLD AUTO: 11.1 FL (ref 9–12.9)
PMV BLD AUTO: 11.3 FL (ref 9–12.9)
PMV BLD AUTO: 11.7 FL (ref 9–12.9)
PMV BLD AUTO: 11.8 FL (ref 9–12.9)
PMV BLD AUTO: 12.2 FL (ref 9–12.9)
PO2 BLDA: 140 MMHG (ref 64–87)
PO2 BLDA: 95 MMHG (ref 64–87)
PO2 BLDA: 97.7 MMHG (ref 64–87)
PO2 TEMP ADJ BLDA: 85 MMHG (ref 64–87)
PO2 TEMP ADJ BLDA: 91.4 MMHG (ref 64–87)
POIKILOCYTOSIS BLD QL SMEAR: NORMAL
POLYCHROMASIA BLD QL SMEAR: NORMAL
POLYCHROMASIA BLD QL SMEAR: NORMAL
POTASSIUM SERPL-SCNC: 4.1 MMOL/L (ref 3.6–5.5)
POTASSIUM SERPL-SCNC: 4.6 MMOL/L (ref 3.6–5.5)
POTASSIUM SERPL-SCNC: 4.8 MMOL/L (ref 3.6–5.5)
POTASSIUM SERPL-SCNC: 4.8 MMOL/L (ref 3.6–5.5)
POTASSIUM SERPL-SCNC: 4.9 MMOL/L (ref 3.6–5.5)
POTASSIUM SERPL-SCNC: 5.2 MMOL/L (ref 3.6–5.5)
POTASSIUM SERPL-SCNC: 5.3 MMOL/L (ref 3.6–5.5)
POTASSIUM SERPL-SCNC: 5.6 MMOL/L (ref 3.6–5.5)
PREALB SERPL-MCNC: 3.4 MG/DL (ref 18–38)
PREALB SERPL-MCNC: <3 MG/DL (ref 18–38)
PROCALCITONIN SERPL-MCNC: 30 NG/ML
PRODUCT TYPE UPROD: NORMAL
PROMYELOCYTES NFR BLD MANUAL: 0.9 %
PROT SERPL-MCNC: 6.1 G/DL (ref 6–8.2)
PROT SERPL-MCNC: 6.2 G/DL (ref 6–8.2)
PROT SERPL-MCNC: 6.3 G/DL (ref 6–8.2)
PROT SERPL-MCNC: 6.4 G/DL (ref 6–8.2)
PROT SERPL-MCNC: 6.4 G/DL (ref 6–8.2)
PROT SERPL-MCNC: 6.5 G/DL (ref 6–8.2)
PROT SERPL-MCNC: 6.7 G/DL (ref 6–8.2)
PROT SERPL-MCNC: 6.7 G/DL (ref 6–8.2)
PROT UR QL STRIP: >=300 MG/DL
PROTHROMBIN TIME: 21.7 SEC (ref 12–14.6)
PROTHROMBIN TIME: 22.5 SEC (ref 12–14.6)
RBC # BLD AUTO: 2.21 M/UL (ref 4.2–5.4)
RBC # BLD AUTO: 2.57 M/UL (ref 4.2–5.4)
RBC # BLD AUTO: 2.71 M/UL (ref 4.2–5.4)
RBC # BLD AUTO: 2.72 M/UL (ref 4.2–5.4)
RBC # BLD AUTO: 2.8 M/UL (ref 4.2–5.4)
RBC # BLD AUTO: 2.82 M/UL (ref 4.2–5.4)
RBC # BLD AUTO: 3 M/UL (ref 4.2–5.4)
RBC # BLD AUTO: 3.04 M/UL (ref 4.2–5.4)
RBC # URNS HPF: ABNORMAL /HPF
RBC BLD AUTO: PRESENT
RBC UR QL AUTO: ABNORMAL
RH BLD: ABNORMAL
ROULEAUX BLD QL SMEAR: NORMAL
SAO2 % BLDA: 94 % (ref 93–99)
SAO2 % BLDA: 96.6 % (ref 93–99)
SAO2 % BLDA: 98 % (ref 93–99)
SARS-COV-2 RNA RESP QL NAA+PROBE: NOTDETECTED
SCHISTOCYTES BLD QL SMEAR: NORMAL
SIGNIFICANT IND 70042: NORMAL
SIGNIFICANT IND 70042: NORMAL
SITE SITE: NORMAL
SITE SITE: NORMAL
SODIUM SERPL-SCNC: 119 MMOL/L (ref 135–145)
SODIUM SERPL-SCNC: 123 MMOL/L (ref 135–145)
SODIUM SERPL-SCNC: 123 MMOL/L (ref 135–145)
SODIUM SERPL-SCNC: 124 MMOL/L (ref 135–145)
SODIUM SERPL-SCNC: 126 MMOL/L (ref 135–145)
SODIUM SERPL-SCNC: 127 MMOL/L (ref 135–145)
SOURCE SOURCE: NORMAL
SOURCE SOURCE: NORMAL
SP GR UR STRIP.AUTO: 1.02
SPECIMEN DRAWN FROM PATIENT: ABNORMAL
SPECIMEN DRAWN FROM PATIENT: ABNORMAL
SPECIMEN SOURCE: NORMAL
T4 FREE SERPL-MCNC: 0.92 NG/DL (ref 0.93–1.7)
TEG ALGORITHM TGALG: ABNORMAL
TIBC SERPL-MCNC: 86 UG/DL (ref 250–450)
TIDAL VOLUME IVT: 350 ML
TIDAL VOLUME IVT: 350 ML
TRANS CELLS #/AREA URNS HPF: ABNORMAL /HPF
TSH SERPL DL<=0.005 MIU/L-ACNC: 7.46 UIU/ML (ref 0.38–5.33)
UIBC SERPL-MCNC: 70 UG/DL (ref 110–370)
UNIT STATUS USTAT: NORMAL
UROBILINOGEN UR STRIP.AUTO-MCNC: 0.2 MG/DL
WBC # BLD AUTO: 12 K/UL (ref 4.8–10.8)
WBC # BLD AUTO: 14.2 K/UL (ref 4.8–10.8)
WBC # BLD AUTO: 16.6 K/UL (ref 4.8–10.8)
WBC # BLD AUTO: 16.6 K/UL (ref 4.8–10.8)
WBC # BLD AUTO: 17.4 K/UL (ref 4.8–10.8)
WBC # BLD AUTO: 18.6 K/UL (ref 4.8–10.8)
WBC # BLD AUTO: 21.7 K/UL (ref 4.8–10.8)
WBC # BLD AUTO: 22.1 K/UL (ref 4.8–10.8)
WBC #/AREA URNS HPF: ABNORMAL /HPF
YEAST BUDDING URNS QL: PRESENT /HPF
YEAST HYPHAE #/AREA URNS HPF: PRESENT /HPF

## 2023-01-01 PROCEDURE — 85055 RETICULATED PLATELET ASSAY: CPT

## 2023-01-01 PROCEDURE — 87635 SARS-COV-2 COVID-19 AMP PRB: CPT

## 2023-01-01 PROCEDURE — 85576 BLOOD PLATELET AGGREGATION: CPT

## 2023-01-01 PROCEDURE — 700102 HCHG RX REV CODE 250 W/ 637 OVERRIDE(OP): Performed by: HOSPITALIST

## 2023-01-01 PROCEDURE — 82270 OCCULT BLOOD FECES: CPT

## 2023-01-01 PROCEDURE — 85025 COMPLETE CBC W/AUTO DIFF WBC: CPT

## 2023-01-01 PROCEDURE — 99291 CRITICAL CARE FIRST HOUR: CPT | Mod: 25 | Performed by: INTERNAL MEDICINE

## 2023-01-01 PROCEDURE — 71045 X-RAY EXAM CHEST 1 VIEW: CPT

## 2023-01-01 PROCEDURE — 700105 HCHG RX REV CODE 258

## 2023-01-01 PROCEDURE — 99233 SBSQ HOSP IP/OBS HIGH 50: CPT | Performed by: STUDENT IN AN ORGANIZED HEALTH CARE EDUCATION/TRAINING PROGRAM

## 2023-01-01 PROCEDURE — 700111 HCHG RX REV CODE 636 W/ 250 OVERRIDE (IP): Performed by: INTERNAL MEDICINE

## 2023-01-01 PROCEDURE — 87070 CULTURE OTHR SPECIMN AEROBIC: CPT

## 2023-01-01 PROCEDURE — 700111 HCHG RX REV CODE 636 W/ 250 OVERRIDE (IP): Performed by: STUDENT IN AN ORGANIZED HEALTH CARE EDUCATION/TRAINING PROGRAM

## 2023-01-01 PROCEDURE — 700105 HCHG RX REV CODE 258: Performed by: INTERNAL MEDICINE

## 2023-01-01 PROCEDURE — 93010 ELECTROCARDIOGRAM REPORT: CPT | Performed by: INTERNAL MEDICINE

## 2023-01-01 PROCEDURE — 82533 TOTAL CORTISOL: CPT

## 2023-01-01 PROCEDURE — 770001 HCHG ROOM/CARE - MED/SURG/GYN PRIV*

## 2023-01-01 PROCEDURE — 85347 COAGULATION TIME ACTIVATED: CPT

## 2023-01-01 PROCEDURE — 84134 ASSAY OF PREALBUMIN: CPT

## 2023-01-01 PROCEDURE — 82962 GLUCOSE BLOOD TEST: CPT

## 2023-01-01 PROCEDURE — 99152 MOD SED SAME PHYS/QHP 5/>YRS: CPT

## 2023-01-01 PROCEDURE — 93005 ELECTROCARDIOGRAM TRACING: CPT | Performed by: INTERNAL MEDICINE

## 2023-01-01 PROCEDURE — 83735 ASSAY OF MAGNESIUM: CPT

## 2023-01-01 PROCEDURE — 31500 INSERT EMERGENCY AIRWAY: CPT | Performed by: INTERNAL MEDICINE

## 2023-01-01 PROCEDURE — 700102 HCHG RX REV CODE 250 W/ 637 OVERRIDE(OP): Performed by: STUDENT IN AN ORGANIZED HEALTH CARE EDUCATION/TRAINING PROGRAM

## 2023-01-01 PROCEDURE — 700102 HCHG RX REV CODE 250 W/ 637 OVERRIDE(OP): Performed by: INTERNAL MEDICINE

## 2023-01-01 PROCEDURE — 99222 1ST HOSP IP/OBS MODERATE 55: CPT | Performed by: INTERNAL MEDICINE

## 2023-01-01 PROCEDURE — 99497 ADVNCD CARE PLAN 30 MIN: CPT | Performed by: STUDENT IN AN ORGANIZED HEALTH CARE EDUCATION/TRAINING PROGRAM

## 2023-01-01 PROCEDURE — 36600 WITHDRAWAL OF ARTERIAL BLOOD: CPT

## 2023-01-01 PROCEDURE — 700101 HCHG RX REV CODE 250: Performed by: STUDENT IN AN ORGANIZED HEALTH CARE EDUCATION/TRAINING PROGRAM

## 2023-01-01 PROCEDURE — 99232 SBSQ HOSP IP/OBS MODERATE 35: CPT | Performed by: INTERNAL MEDICINE

## 2023-01-01 PROCEDURE — 87040 BLOOD CULTURE FOR BACTERIA: CPT

## 2023-01-01 PROCEDURE — 80053 COMPREHEN METABOLIC PANEL: CPT

## 2023-01-01 PROCEDURE — 94002 VENT MGMT INPAT INIT DAY: CPT

## 2023-01-01 PROCEDURE — 700105 HCHG RX REV CODE 258: Performed by: STUDENT IN AN ORGANIZED HEALTH CARE EDUCATION/TRAINING PROGRAM

## 2023-01-01 PROCEDURE — A9270 NON-COVERED ITEM OR SERVICE: HCPCS | Performed by: STUDENT IN AN ORGANIZED HEALTH CARE EDUCATION/TRAINING PROGRAM

## 2023-01-01 PROCEDURE — 99232 SBSQ HOSP IP/OBS MODERATE 35: CPT | Performed by: STUDENT IN AN ORGANIZED HEALTH CARE EDUCATION/TRAINING PROGRAM

## 2023-01-01 PROCEDURE — 86140 C-REACTIVE PROTEIN: CPT

## 2023-01-01 PROCEDURE — 83930 ASSAY OF BLOOD OSMOLALITY: CPT

## 2023-01-01 PROCEDURE — 86880 COOMBS TEST DIRECT: CPT | Mod: 91

## 2023-01-01 PROCEDURE — A9270 NON-COVERED ITEM OR SERVICE: HCPCS | Performed by: HOSPITALIST

## 2023-01-01 PROCEDURE — 83605 ASSAY OF LACTIC ACID: CPT

## 2023-01-01 PROCEDURE — 85384 FIBRINOGEN ACTIVITY: CPT

## 2023-01-01 PROCEDURE — 700101 HCHG RX REV CODE 250: Performed by: INTERNAL MEDICINE

## 2023-01-01 PROCEDURE — 84100 ASSAY OF PHOSPHORUS: CPT

## 2023-01-01 PROCEDURE — 99153 MOD SED SAME PHYS/QHP EA: CPT

## 2023-01-01 PROCEDURE — 99291 CRITICAL CARE FIRST HOUR: CPT | Mod: GC | Performed by: STUDENT IN AN ORGANIZED HEALTH CARE EDUCATION/TRAINING PROGRAM

## 2023-01-01 PROCEDURE — 700105 HCHG RX REV CODE 258: Performed by: HOSPITALIST

## 2023-01-01 PROCEDURE — 86900 BLOOD TYPING SEROLOGIC ABO: CPT

## 2023-01-01 PROCEDURE — 84145 PROCALCITONIN (PCT): CPT

## 2023-01-01 PROCEDURE — 85007 BL SMEAR W/DIFF WBC COUNT: CPT

## 2023-01-01 PROCEDURE — 83605 ASSAY OF LACTIC ACID: CPT | Mod: 91

## 2023-01-01 PROCEDURE — 87493 C DIFF AMPLIFIED PROBE: CPT

## 2023-01-01 PROCEDURE — 83540 ASSAY OF IRON: CPT

## 2023-01-01 PROCEDURE — 82803 BLOOD GASES ANY COMBINATION: CPT

## 2023-01-01 PROCEDURE — 0T25X0Z CHANGE DRAINAGE DEVICE IN KIDNEY, EXTERNAL APPROACH: ICD-10-PCS | Performed by: RADIOLOGY

## 2023-01-01 PROCEDURE — 36430 TRANSFUSION BLD/BLD COMPNT: CPT

## 2023-01-01 PROCEDURE — 51798 US URINE CAPACITY MEASURE: CPT

## 2023-01-01 PROCEDURE — 82962 GLUCOSE BLOOD TEST: CPT | Mod: 91

## 2023-01-01 PROCEDURE — 84439 ASSAY OF FREE THYROXINE: CPT

## 2023-01-01 PROCEDURE — 770022 HCHG ROOM/CARE - ICU (200)

## 2023-01-01 PROCEDURE — 87205 SMEAR GRAM STAIN: CPT

## 2023-01-01 PROCEDURE — 700111 HCHG RX REV CODE 636 W/ 250 OVERRIDE (IP): Performed by: HOSPITALIST

## 2023-01-01 PROCEDURE — 94003 VENT MGMT INPAT SUBQ DAY: CPT

## 2023-01-01 PROCEDURE — 97162 PT EVAL MOD COMPLEX 30 MIN: CPT

## 2023-01-01 PROCEDURE — 5A1935Z RESPIRATORY VENTILATION, LESS THAN 24 CONSECUTIVE HOURS: ICD-10-PCS | Performed by: INTERNAL MEDICINE

## 2023-01-01 PROCEDURE — A9270 NON-COVERED ITEM OR SERVICE: HCPCS | Performed by: INTERNAL MEDICINE

## 2023-01-01 PROCEDURE — 700101 HCHG RX REV CODE 250: Performed by: HOSPITALIST

## 2023-01-01 PROCEDURE — 87040 BLOOD CULTURE FOR BACTERIA: CPT | Mod: 91

## 2023-01-01 PROCEDURE — P9016 RBC LEUKOCYTES REDUCED: HCPCS

## 2023-01-01 PROCEDURE — 85027 COMPLETE CBC AUTOMATED: CPT

## 2023-01-01 PROCEDURE — 86922 COMPATIBILITY TEST ANTIGLOB: CPT | Mod: 91

## 2023-01-01 PROCEDURE — 0TP5X0Z REMOVAL OF DRAINAGE DEVICE FROM KIDNEY, EXTERNAL APPROACH: ICD-10-PCS | Performed by: RADIOLOGY

## 2023-01-01 PROCEDURE — 86901 BLOOD TYPING SEROLOGIC RH(D): CPT

## 2023-01-01 PROCEDURE — 700111 HCHG RX REV CODE 636 W/ 250 OVERRIDE (IP): Mod: JZ | Performed by: STUDENT IN AN ORGANIZED HEALTH CARE EDUCATION/TRAINING PROGRAM

## 2023-01-01 PROCEDURE — 85610 PROTHROMBIN TIME: CPT

## 2023-01-01 PROCEDURE — 99232 SBSQ HOSP IP/OBS MODERATE 35: CPT | Mod: 25 | Performed by: STUDENT IN AN ORGANIZED HEALTH CARE EDUCATION/TRAINING PROGRAM

## 2023-01-01 PROCEDURE — 86902 BLOOD TYPE ANTIGEN DONOR EA: CPT | Mod: 91

## 2023-01-01 PROCEDURE — 770000 HCHG ROOM/CARE - INTERMEDIATE ICU *

## 2023-01-01 PROCEDURE — 82728 ASSAY OF FERRITIN: CPT

## 2023-01-01 PROCEDURE — 86850 RBC ANTIBODY SCREEN: CPT

## 2023-01-01 PROCEDURE — 700111 HCHG RX REV CODE 636 W/ 250 OVERRIDE (IP)

## 2023-01-01 PROCEDURE — 83036 HEMOGLOBIN GLYCOSYLATED A1C: CPT

## 2023-01-01 PROCEDURE — 36415 COLL VENOUS BLD VENIPUNCTURE: CPT

## 2023-01-01 PROCEDURE — 30233N1 TRANSFUSION OF NONAUTOLOGOUS RED BLOOD CELLS INTO PERIPHERAL VEIN, PERCUTANEOUS APPROACH: ICD-10-PCS | Performed by: STUDENT IN AN ORGANIZED HEALTH CARE EDUCATION/TRAINING PROGRAM

## 2023-01-01 PROCEDURE — 86870 RBC ANTIBODY IDENTIFICATION: CPT | Mod: 91

## 2023-01-01 PROCEDURE — 0T9330Z DRAINAGE OF RIGHT KIDNEY PELVIS WITH DRAINAGE DEVICE, PERCUTANEOUS APPROACH: ICD-10-PCS | Performed by: RADIOLOGY

## 2023-01-01 PROCEDURE — 700105 HCHG RX REV CODE 258: Mod: JZ | Performed by: HOSPITALIST

## 2023-01-01 PROCEDURE — 99233 SBSQ HOSP IP/OBS HIGH 50: CPT | Performed by: HOSPITALIST

## 2023-01-01 PROCEDURE — 74176 CT ABD & PELVIS W/O CONTRAST: CPT

## 2023-01-01 PROCEDURE — 0BH17EZ INSERTION OF ENDOTRACHEAL AIRWAY INTO TRACHEA, VIA NATURAL OR ARTIFICIAL OPENING: ICD-10-PCS | Performed by: INTERNAL MEDICINE

## 2023-01-01 PROCEDURE — 99233 SBSQ HOSP IP/OBS HIGH 50: CPT | Mod: 25 | Performed by: STUDENT IN AN ORGANIZED HEALTH CARE EDUCATION/TRAINING PROGRAM

## 2023-01-01 PROCEDURE — 81001 URINALYSIS AUTO W/SCOPE: CPT

## 2023-01-01 PROCEDURE — 31500 INSERT EMERGENCY AIRWAY: CPT

## 2023-01-01 PROCEDURE — 83880 ASSAY OF NATRIURETIC PEPTIDE: CPT

## 2023-01-01 PROCEDURE — 99233 SBSQ HOSP IP/OBS HIGH 50: CPT | Performed by: INTERNAL MEDICINE

## 2023-01-01 PROCEDURE — 99292 CRITICAL CARE ADDL 30 MIN: CPT | Performed by: INTERNAL MEDICINE

## 2023-01-01 PROCEDURE — 700105 HCHG RX REV CODE 258: Mod: JZ | Performed by: INTERNAL MEDICINE

## 2023-01-01 PROCEDURE — 86860 RBC ANTIBODY ELUTION: CPT

## 2023-01-01 PROCEDURE — 99291 CRITICAL CARE FIRST HOUR: CPT | Performed by: INTERNAL MEDICINE

## 2023-01-01 PROCEDURE — 92950 HEART/LUNG RESUSCITATION CPR: CPT

## 2023-01-01 PROCEDURE — 99222 1ST HOSP IP/OBS MODERATE 55: CPT | Mod: 25 | Performed by: STUDENT IN AN ORGANIZED HEALTH CARE EDUCATION/TRAINING PROGRAM

## 2023-01-01 PROCEDURE — 83550 IRON BINDING TEST: CPT

## 2023-01-01 PROCEDURE — 84443 ASSAY THYROID STIM HORMONE: CPT

## 2023-01-01 PROCEDURE — 700117 HCHG RX CONTRAST REV CODE 255: Performed by: RADIOLOGY

## 2023-01-01 RX ORDER — PHENYLEPHRINE HCL IN 0.9% NACL 0.5 MG/5ML
300 SYRINGE (ML) INTRAVENOUS ONCE
Status: COMPLETED | OUTPATIENT
Start: 2023-01-01 | End: 2023-01-01

## 2023-01-01 RX ORDER — HYDROMORPHONE HYDROCHLORIDE 1 MG/ML
.5-2 INJECTION, SOLUTION INTRAMUSCULAR; INTRAVENOUS; SUBCUTANEOUS
Status: DISCONTINUED | OUTPATIENT
Start: 2023-01-01 | End: 2023-01-01

## 2023-01-01 RX ORDER — DRONABINOL 5 MG/1
5 CAPSULE ORAL
Status: DISCONTINUED | OUTPATIENT
Start: 2023-01-01 | End: 2023-01-01

## 2023-01-01 RX ORDER — DEXTROSE MONOHYDRATE 25 G/50ML
50 INJECTION, SOLUTION INTRAVENOUS PRN
Status: DISCONTINUED | OUTPATIENT
Start: 2023-01-01 | End: 2023-01-01

## 2023-01-01 RX ORDER — INSULIN LISPRO 100 [IU]/ML
1-6 INJECTION, SOLUTION INTRAVENOUS; SUBCUTANEOUS EVERY 6 HOURS
Status: DISCONTINUED | OUTPATIENT
Start: 2023-01-01 | End: 2023-01-01

## 2023-01-01 RX ORDER — FAMOTIDINE 20 MG/1
20 TABLET, FILM COATED ORAL DAILY
Status: DISCONTINUED | OUTPATIENT
Start: 2023-01-01 | End: 2023-01-01

## 2023-01-01 RX ORDER — LEVOTHYROXINE SODIUM 112 UG/1
224 TABLET ORAL
Status: DISCONTINUED | OUTPATIENT
Start: 2023-01-01 | End: 2023-01-01

## 2023-01-01 RX ORDER — SODIUM CHLORIDE 9 MG/ML
500 INJECTION, SOLUTION INTRAVENOUS
Status: DISCONTINUED | OUTPATIENT
Start: 2023-01-01 | End: 2023-01-01 | Stop reason: HOSPADM

## 2023-01-01 RX ORDER — ONDANSETRON 2 MG/ML
4 INJECTION INTRAMUSCULAR; INTRAVENOUS PRN
Status: DISCONTINUED | OUTPATIENT
Start: 2023-01-01 | End: 2023-01-01 | Stop reason: HOSPADM

## 2023-01-01 RX ORDER — POTASSIUM CHLORIDE 1500 MG/1
20 TABLET, EXTENDED RELEASE ORAL DAILY
COMMUNITY

## 2023-01-01 RX ORDER — HEPARIN SODIUM 5000 [USP'U]/ML
5000 INJECTION, SOLUTION INTRAVENOUS; SUBCUTANEOUS EVERY 8 HOURS
Status: DISCONTINUED | OUTPATIENT
Start: 2023-01-01 | End: 2023-01-01

## 2023-01-01 RX ORDER — ROSUVASTATIN CALCIUM 20 MG/1
40 TABLET, COATED ORAL DAILY
Status: DISCONTINUED | OUTPATIENT
Start: 2023-01-01 | End: 2023-01-01

## 2023-01-01 RX ORDER — ACETAMINOPHEN 325 MG/1
650 TABLET ORAL EVERY 6 HOURS PRN
Status: DISCONTINUED | OUTPATIENT
Start: 2023-01-01 | End: 2023-01-01

## 2023-01-01 RX ORDER — LEVOTHYROXINE SODIUM 175 UG/1
175 TABLET ORAL
Status: DISCONTINUED | OUTPATIENT
Start: 2023-01-01 | End: 2023-01-01

## 2023-01-01 RX ORDER — MORPHINE SULFATE 4 MG/ML
1-10 INJECTION INTRAVENOUS
Status: DISCONTINUED | OUTPATIENT
Start: 2023-01-01 | End: 2023-01-01 | Stop reason: HOSPADM

## 2023-01-01 RX ORDER — AMOXICILLIN 250 MG
2 CAPSULE ORAL 2 TIMES DAILY
Status: DISCONTINUED | OUTPATIENT
Start: 2023-01-01 | End: 2023-01-01

## 2023-01-01 RX ORDER — OXYCODONE HYDROCHLORIDE 5 MG/1
2.5 TABLET ORAL EVERY 6 HOURS PRN
Status: DISCONTINUED | OUTPATIENT
Start: 2023-01-01 | End: 2023-01-01

## 2023-01-01 RX ORDER — SODIUM CHLORIDE, SODIUM LACTATE, POTASSIUM CHLORIDE, CALCIUM CHLORIDE 600; 310; 30; 20 MG/100ML; MG/100ML; MG/100ML; MG/100ML
INJECTION, SOLUTION INTRAVENOUS CONTINUOUS
Status: DISCONTINUED | OUTPATIENT
Start: 2023-01-01 | End: 2023-01-01

## 2023-01-01 RX ORDER — MIDAZOLAM HYDROCHLORIDE 1 MG/ML
.5-2 INJECTION INTRAMUSCULAR; INTRAVENOUS PRN
Status: DISCONTINUED | OUTPATIENT
Start: 2023-01-01 | End: 2023-01-01 | Stop reason: HOSPADM

## 2023-01-01 RX ORDER — BISACODYL 10 MG
10 SUPPOSITORY, RECTAL RECTAL
Status: DISCONTINUED | OUTPATIENT
Start: 2023-01-01 | End: 2023-01-01

## 2023-01-01 RX ORDER — INSULIN LISPRO 100 [IU]/ML
2-9 INJECTION, SOLUTION INTRAVENOUS; SUBCUTANEOUS
Status: DISCONTINUED | OUTPATIENT
Start: 2023-01-01 | End: 2023-01-01

## 2023-01-01 RX ORDER — MIDODRINE HYDROCHLORIDE 5 MG/1
10 TABLET ORAL
Status: DISCONTINUED | OUTPATIENT
Start: 2023-01-01 | End: 2023-01-01

## 2023-01-01 RX ORDER — FENUGREEK SEED/BL.THISTLE/ANIS 340 MG
1 CAPSULE ORAL 3 TIMES DAILY
COMMUNITY

## 2023-01-01 RX ORDER — NYSTATIN 100000 [USP'U]/G
POWDER TOPICAL 2 TIMES DAILY
Status: DISPENSED | OUTPATIENT
Start: 2023-01-01 | End: 2023-01-01

## 2023-01-01 RX ORDER — ROCURONIUM BROMIDE 10 MG/ML
50 INJECTION, SOLUTION INTRAVENOUS ONCE
Status: COMPLETED | OUTPATIENT
Start: 2023-01-01 | End: 2023-01-01

## 2023-01-01 RX ORDER — LABETALOL HYDROCHLORIDE 5 MG/ML
10 INJECTION, SOLUTION INTRAVENOUS EVERY 4 HOURS PRN
Status: DISCONTINUED | OUTPATIENT
Start: 2023-01-01 | End: 2023-01-01

## 2023-01-01 RX ORDER — DEXMEDETOMIDINE HYDROCHLORIDE 4 UG/ML
0-1.5 INJECTION, SOLUTION INTRAVENOUS CONTINUOUS
Status: DISCONTINUED | OUTPATIENT
Start: 2023-01-01 | End: 2023-01-01

## 2023-01-01 RX ORDER — NOREPINEPHRINE BITARTRATE 0.03 MG/ML
0-1 INJECTION, SOLUTION INTRAVENOUS CONTINUOUS
Status: DISCONTINUED | OUTPATIENT
Start: 2023-01-01 | End: 2023-01-01

## 2023-01-01 RX ORDER — CEPHALEXIN 500 MG/1
500 CAPSULE ORAL 2 TIMES DAILY
COMMUNITY

## 2023-01-01 RX ORDER — MIDAZOLAM HYDROCHLORIDE 1 MG/ML
INJECTION INTRAMUSCULAR; INTRAVENOUS
Status: COMPLETED
Start: 2023-01-01 | End: 2023-01-01

## 2023-01-01 RX ORDER — OXYCODONE HYDROCHLORIDE 5 MG/1
5 TABLET ORAL EVERY 6 HOURS PRN
Status: DISCONTINUED | OUTPATIENT
Start: 2023-01-01 | End: 2023-01-01

## 2023-01-01 RX ORDER — INSULIN LISPRO 100 [IU]/ML
3-9 INJECTION, SOLUTION INTRAVENOUS; SUBCUTANEOUS
Status: DISCONTINUED | OUTPATIENT
Start: 2023-01-01 | End: 2023-01-01

## 2023-01-01 RX ORDER — POLYETHYLENE GLYCOL 3350 17 G/17G
1 POWDER, FOR SOLUTION ORAL
Status: DISCONTINUED | OUTPATIENT
Start: 2023-01-01 | End: 2023-01-01

## 2023-01-01 RX ORDER — LETROZOLE 2.5 MG/1
2.5 TABLET, FILM COATED ORAL DAILY
COMMUNITY
Start: 2023-01-01

## 2023-01-01 RX ORDER — LORAZEPAM 2 MG/ML
1-10 INJECTION INTRAMUSCULAR
Status: DISCONTINUED | OUTPATIENT
Start: 2023-01-01 | End: 2023-01-01 | Stop reason: HOSPADM

## 2023-01-01 RX ORDER — ETOMIDATE 2 MG/ML
20 INJECTION INTRAVENOUS ONCE
Status: COMPLETED | OUTPATIENT
Start: 2023-01-01 | End: 2023-01-01

## 2023-01-01 RX ORDER — FIDAXOMICIN 200 MG/1
200 TABLET, FILM COATED ORAL EVERY 12 HOURS
COMMUNITY

## 2023-01-01 RX ORDER — SODIUM CHLORIDE 9 MG/ML
INJECTION, SOLUTION INTRAVENOUS CONTINUOUS
Status: DISCONTINUED | OUTPATIENT
Start: 2023-01-01 | End: 2023-01-01

## 2023-01-01 RX ORDER — PHENYLEPHRINE HCL IN 0.9% NACL 0.5 MG/5ML
SYRINGE (ML) INTRAVENOUS
Status: COMPLETED
Start: 2023-01-01 | End: 2023-01-01

## 2023-01-01 RX ORDER — ATROPINE SULFATE 10 MG/ML
2 SOLUTION/ DROPS OPHTHALMIC EVERY 4 HOURS PRN
Status: DISCONTINUED | OUTPATIENT
Start: 2023-01-01 | End: 2023-01-01 | Stop reason: HOSPADM

## 2023-01-01 RX ADMIN — MIDODRINE HYDROCHLORIDE 10 MG: 5 TABLET ORAL at 12:18

## 2023-01-01 RX ADMIN — SODIUM BICARBONATE 50 MEQ: 84 INJECTION, SOLUTION INTRAVENOUS at 03:51

## 2023-01-01 RX ADMIN — DEXTROSE MONOHYDRATE 25 G: 100 INJECTION, SOLUTION INTRAVENOUS at 05:47

## 2023-01-01 RX ADMIN — MEROPENEM 500 MG: 500 INJECTION, POWDER, FOR SOLUTION INTRAVENOUS at 05:35

## 2023-01-01 RX ADMIN — OXYCODONE HYDROCHLORIDE 2.5 MG: 5 TABLET ORAL at 18:04

## 2023-01-01 RX ADMIN — THIAMINE HYDROCHLORIDE 100 MG: 100 INJECTION, SOLUTION INTRAMUSCULAR; INTRAVENOUS at 05:17

## 2023-01-01 RX ADMIN — DEXTROSE MONOHYDRATE 25 G: 100 INJECTION, SOLUTION INTRAVENOUS at 13:26

## 2023-01-01 RX ADMIN — ETOMIDATE 20 MG: 2 INJECTION, SOLUTION INTRAVENOUS at 17:09

## 2023-01-01 RX ADMIN — Medication 300 MCG: at 16:59

## 2023-01-01 RX ADMIN — INSULIN GLARGINE-YFGN 32 UNITS: 100 INJECTION, SOLUTION SUBCUTANEOUS at 17:24

## 2023-01-01 RX ADMIN — LEVOTHYROXINE SODIUM 175 MCG: 0.17 TABLET ORAL at 05:50

## 2023-01-01 RX ADMIN — MEROPENEM 500 MG: 500 INJECTION, POWDER, FOR SOLUTION INTRAVENOUS at 05:34

## 2023-01-01 RX ADMIN — FENTANYL CITRATE 25 MCG/HR: 50 INJECTION, SOLUTION INTRAMUSCULAR; INTRAVENOUS at 22:18

## 2023-01-01 RX ADMIN — MEROPENEM 500 MG: 500 INJECTION, POWDER, FOR SOLUTION INTRAVENOUS at 17:46

## 2023-01-01 RX ADMIN — SODIUM CHLORIDE: 9 INJECTION, SOLUTION INTRAVENOUS at 21:30

## 2023-01-01 RX ADMIN — ROSUVASTATIN 40 MG: 10 TABLET, FILM COATED ORAL at 05:19

## 2023-01-01 RX ADMIN — LEVOTHYROXINE SODIUM 224 MCG: 0.11 TABLET ORAL at 05:32

## 2023-01-01 RX ADMIN — MIDODRINE HYDROCHLORIDE 10 MG: 5 TABLET ORAL at 09:43

## 2023-01-01 RX ADMIN — CEFTRIAXONE SODIUM 2000 MG: 10 INJECTION, POWDER, FOR SOLUTION INTRAVENOUS at 17:46

## 2023-01-01 RX ADMIN — SENNOSIDES AND DOCUSATE SODIUM 2 TABLET: 50; 8.6 TABLET ORAL at 05:19

## 2023-01-01 RX ADMIN — CEFTRIAXONE SODIUM 2000 MG: 10 INJECTION, POWDER, FOR SOLUTION INTRAVENOUS at 18:32

## 2023-01-01 RX ADMIN — ROSUVASTATIN 40 MG: 10 TABLET, FILM COATED ORAL at 05:32

## 2023-01-01 RX ADMIN — SODIUM CHLORIDE: 9 INJECTION, SOLUTION INTRAVENOUS at 00:12

## 2023-01-01 RX ADMIN — DRONABINOL 5 MG: 5 CAPSULE ORAL at 11:56

## 2023-01-01 RX ADMIN — CEFTRIAXONE SODIUM 2000 MG: 10 INJECTION, POWDER, FOR SOLUTION INTRAVENOUS at 19:59

## 2023-01-01 RX ADMIN — VANCOMYCIN HYDROCHLORIDE 125 MG: 5 INJECTION, POWDER, LYOPHILIZED, FOR SOLUTION INTRAVENOUS at 17:24

## 2023-01-01 RX ADMIN — CEFTRIAXONE SODIUM 2000 MG: 10 INJECTION, POWDER, FOR SOLUTION INTRAVENOUS at 17:11

## 2023-01-01 RX ADMIN — MIDODRINE HYDROCHLORIDE 10 MG: 5 TABLET ORAL at 11:02

## 2023-01-01 RX ADMIN — MIDODRINE HYDROCHLORIDE 10 MG: 5 TABLET ORAL at 08:07

## 2023-01-01 RX ADMIN — MIDAZOLAM HYDROCHLORIDE 0.5 MG: 1 INJECTION INTRAMUSCULAR; INTRAVENOUS at 15:59

## 2023-01-01 RX ADMIN — SODIUM CHLORIDE: 9 INJECTION, SOLUTION INTRAVENOUS at 08:25

## 2023-01-01 RX ADMIN — VANCOMYCIN HYDROCHLORIDE 125 MG: 5 INJECTION, POWDER, LYOPHILIZED, FOR SOLUTION INTRAVENOUS at 05:33

## 2023-01-01 RX ADMIN — ROCURONIUM BROMIDE 50 MG: 50 INJECTION, SOLUTION INTRAVENOUS at 17:09

## 2023-01-01 RX ADMIN — LEVOTHYROXINE SODIUM 224 MCG: 0.11 TABLET ORAL at 05:19

## 2023-01-01 RX ADMIN — MIDODRINE HYDROCHLORIDE 10 MG: 5 TABLET ORAL at 08:11

## 2023-01-01 RX ADMIN — DRONABINOL 5 MG: 5 CAPSULE ORAL at 17:11

## 2023-01-01 RX ADMIN — NYSTATIN: 100000 POWDER TOPICAL at 05:31

## 2023-01-01 RX ADMIN — MIDODRINE HYDROCHLORIDE 10 MG: 5 TABLET ORAL at 11:56

## 2023-01-01 RX ADMIN — Medication 300 MCG: at 17:09

## 2023-01-01 RX ADMIN — DEXMEDETOMIDINE 0.5 MCG/KG/HR: 100 INJECTION, SOLUTION INTRAVENOUS at 06:10

## 2023-01-01 RX ADMIN — THIAMINE HYDROCHLORIDE 100 MG: 100 INJECTION, SOLUTION INTRAMUSCULAR; INTRAVENOUS at 06:21

## 2023-01-01 RX ADMIN — MIDODRINE HYDROCHLORIDE 10 MG: 5 TABLET ORAL at 17:10

## 2023-01-01 RX ADMIN — DRONABINOL 5 MG: 5 CAPSULE ORAL at 11:02

## 2023-01-01 RX ADMIN — NOREPINEPHRINE BITARTRATE 0.5 MCG/KG/MIN: 1 INJECTION, SOLUTION, CONCENTRATE INTRAVENOUS at 03:37

## 2023-01-01 RX ADMIN — SENNOSIDES AND DOCUSATE SODIUM 2 TABLET: 50; 8.6 TABLET ORAL at 05:50

## 2023-01-01 RX ADMIN — NOREPINEPHRINE BITARTRATE 0.6 MCG/KG/MIN: 1 INJECTION, SOLUTION, CONCENTRATE INTRAVENOUS at 13:11

## 2023-01-01 RX ADMIN — FENTANYL CITRATE 100 MCG: 50 INJECTION, SOLUTION INTRAMUSCULAR; INTRAVENOUS at 17:56

## 2023-01-01 RX ADMIN — SODIUM CHLORIDE: 9 INJECTION, SOLUTION INTRAVENOUS at 23:16

## 2023-01-01 RX ADMIN — LORAZEPAM 5 MG: 2 INJECTION INTRAMUSCULAR; INTRAVENOUS at 14:43

## 2023-01-01 RX ADMIN — MORPHINE SULFATE 5 MG: 4 INJECTION INTRAVENOUS at 14:44

## 2023-01-01 RX ADMIN — DRONABINOL 5 MG: 5 CAPSULE ORAL at 12:18

## 2023-01-01 RX ADMIN — MIDAZOLAM 0.5 MG: 1 INJECTION, SOLUTION INTRAMUSCULAR; INTRAVENOUS at 15:59

## 2023-01-01 RX ADMIN — SODIUM BICARBONATE 100 MEQ: 84 INJECTION, SOLUTION INTRAVENOUS at 23:47

## 2023-01-01 RX ADMIN — DRONABINOL 5 MG: 5 CAPSULE ORAL at 18:32

## 2023-01-01 RX ADMIN — SODIUM BICARBONATE 75 MEQ: 84 INJECTION, SOLUTION INTRAVENOUS at 03:39

## 2023-01-01 RX ADMIN — DEXMEDETOMIDINE 0.2 MCG/KG/HR: 100 INJECTION, SOLUTION INTRAVENOUS at 17:44

## 2023-01-01 RX ADMIN — MEROPENEM 500 MG: 500 INJECTION, POWDER, FOR SOLUTION INTRAVENOUS at 04:21

## 2023-01-01 RX ADMIN — THIAMINE HYDROCHLORIDE 100 MG: 100 INJECTION, SOLUTION INTRAMUSCULAR; INTRAVENOUS at 05:39

## 2023-01-01 RX ADMIN — SODIUM BICARBONATE 75 MEQ: 84 INJECTION, SOLUTION INTRAVENOUS at 13:48

## 2023-01-01 RX ADMIN — LEVOTHYROXINE SODIUM 175 MCG: 0.17 TABLET ORAL at 05:46

## 2023-01-01 RX ADMIN — SODIUM CHLORIDE: 9 INJECTION, SOLUTION INTRAVENOUS at 15:56

## 2023-01-01 RX ADMIN — MIDODRINE HYDROCHLORIDE 10 MG: 5 TABLET ORAL at 17:46

## 2023-01-01 RX ADMIN — NOREPINEPHRINE BITARTRATE 0.55 MCG/KG/MIN: 1 INJECTION, SOLUTION, CONCENTRATE INTRAVENOUS at 08:31

## 2023-01-01 RX ADMIN — SODIUM CHLORIDE: 9 INJECTION, SOLUTION INTRAVENOUS at 18:58

## 2023-01-01 RX ADMIN — MEROPENEM 500 MG: 500 INJECTION, POWDER, FOR SOLUTION INTRAVENOUS at 17:29

## 2023-01-01 RX ADMIN — OXYCODONE HYDROCHLORIDE 2.5 MG: 5 TABLET ORAL at 11:59

## 2023-01-01 RX ADMIN — LEVOTHYROXINE SODIUM 175 MCG: 0.17 TABLET ORAL at 05:13

## 2023-01-01 RX ADMIN — OXYCODONE HYDROCHLORIDE 2.5 MG: 5 TABLET ORAL at 18:32

## 2023-01-01 RX ADMIN — OXYCODONE HYDROCHLORIDE 2.5 MG: 5 TABLET ORAL at 08:11

## 2023-01-01 RX ADMIN — DRONABINOL 5 MG: 5 CAPSULE ORAL at 17:46

## 2023-01-01 RX ADMIN — IOHEXOL 25 ML: 300 INJECTION, SOLUTION INTRAVENOUS at 16:28

## 2023-01-01 RX ADMIN — SODIUM CHLORIDE: 9 INJECTION, SOLUTION INTRAVENOUS at 10:30

## 2023-01-01 RX ADMIN — DRONABINOL 5 MG: 5 CAPSULE ORAL at 11:59

## 2023-01-01 RX ADMIN — VASOPRESSIN 0.03 UNITS/MIN: 20 INJECTION INTRAVENOUS at 05:05

## 2023-01-01 RX ADMIN — SODIUM BICARBONATE 75 MEQ: 84 INJECTION, SOLUTION INTRAVENOUS at 15:57

## 2023-01-01 RX ADMIN — VANCOMYCIN HYDROCHLORIDE 125 MG: 5 INJECTION, POWDER, LYOPHILIZED, FOR SOLUTION INTRAVENOUS at 01:15

## 2023-01-01 RX ADMIN — LEVOTHYROXINE SODIUM 175 MCG: 0.17 TABLET ORAL at 06:49

## 2023-01-01 RX ADMIN — ACETAMINOPHEN 650 MG: 325 TABLET, FILM COATED ORAL at 14:30

## 2023-01-01 RX ADMIN — SODIUM BICARBONATE 75 MEQ: 84 INJECTION, SOLUTION INTRAVENOUS at 00:13

## 2023-01-01 RX ADMIN — SODIUM CHLORIDE, POTASSIUM CHLORIDE, SODIUM LACTATE AND CALCIUM CHLORIDE: 600; 310; 30; 20 INJECTION, SOLUTION INTRAVENOUS at 02:34

## 2023-01-01 RX ADMIN — FAMOTIDINE 20 MG: 10 INJECTION, SOLUTION INTRAVENOUS at 05:49

## 2023-01-01 RX ADMIN — LEVOTHYROXINE SODIUM 175 MCG: 0.17 TABLET ORAL at 05:25

## 2023-01-01 RX ADMIN — SODIUM BICARBONATE 75 MEQ: 84 INJECTION, SOLUTION INTRAVENOUS at 15:03

## 2023-01-01 RX ADMIN — VANCOMYCIN HYDROCHLORIDE 125 MG: 5 INJECTION, POWDER, LYOPHILIZED, FOR SOLUTION INTRAVENOUS at 13:06

## 2023-01-01 RX ADMIN — NOREPINEPHRINE BITARTRATE 0.2 MCG/KG/MIN: 1 INJECTION, SOLUTION, CONCENTRATE INTRAVENOUS at 18:33

## 2023-01-01 ASSESSMENT — ENCOUNTER SYMPTOMS
COUGH: 0
HEADACHES: 0
NAUSEA: 0
DEPRESSION: 0
SORE THROAT: 0
RESPIRATORY NEGATIVE: 1
HEARTBURN: 0
HEARTBURN: 0
NEUROLOGICAL NEGATIVE: 1
FEVER: 0
PALPITATIONS: 0
WEAKNESS: 1
VOMITING: 0
MYALGIAS: 1
HEADACHES: 0
VOMITING: 0
FOCAL WEAKNESS: 0
RESPIRATORY NEGATIVE: 1
LOSS OF CONSCIOUSNESS: 0
VOMITING: 0
NECK PAIN: 0
POLYDIPSIA: 0
GASTROINTESTINAL NEGATIVE: 1
SEIZURES: 0
BACK PAIN: 0
DIZZINESS: 0
FOCAL WEAKNESS: 0
COUGH: 0
DIZZINESS: 0
CARDIOVASCULAR NEGATIVE: 1
EYES NEGATIVE: 1
HEARTBURN: 0
FEVER: 0
VOMITING: 0
NERVOUS/ANXIOUS: 1
SPEECH CHANGE: 0
BACK PAIN: 0
NAUSEA: 0
MYALGIAS: 1
FOCAL WEAKNESS: 0
DIARRHEA: 1
CHILLS: 0
NECK PAIN: 0
DEPRESSION: 0
RESPIRATORY NEGATIVE: 1
SHORTNESS OF BREATH: 0
BLURRED VISION: 0
VOMITING: 0
HEMOPTYSIS: 0
NERVOUS/ANXIOUS: 1
WEAKNESS: 0
DIARRHEA: 0
EYES NEGATIVE: 1
SHORTNESS OF BREATH: 0
NEUROLOGICAL NEGATIVE: 1
BACK PAIN: 0
NAUSEA: 1
CHILLS: 0
ORTHOPNEA: 0
FOCAL WEAKNESS: 0
CHILLS: 0
HEARTBURN: 0
CARDIOVASCULAR NEGATIVE: 1
NECK PAIN: 0
NECK PAIN: 0
POLYDIPSIA: 0
EYES NEGATIVE: 1
DEPRESSION: 0
RESPIRATORY NEGATIVE: 1
POLYDIPSIA: 0
MYALGIAS: 1
GASTROINTESTINAL NEGATIVE: 1
CARDIOVASCULAR NEGATIVE: 1
BRUISES/BLEEDS EASILY: 0
BRUISES/BLEEDS EASILY: 0
WEAKNESS: 0
NEUROLOGICAL NEGATIVE: 1
WEAKNESS: 0
PALPITATIONS: 0
BRUISES/BLEEDS EASILY: 0
CARDIOVASCULAR NEGATIVE: 1
BRUISES/BLEEDS EASILY: 0
DIZZINESS: 0
NAUSEA: 0
ABDOMINAL PAIN: 1
NERVOUS/ANXIOUS: 1
GASTROINTESTINAL NEGATIVE: 1
WHEEZING: 0
NAUSEA: 0
DIZZINESS: 0
VOMITING: 0
BLURRED VISION: 0
FOCAL WEAKNESS: 0
SINUS PAIN: 0
EYES NEGATIVE: 1
WEAKNESS: 0
EYES NEGATIVE: 1
COUGH: 0
MYALGIAS: 1
DIZZINESS: 0
HEADACHES: 0
HEADACHES: 0
NEUROLOGICAL NEGATIVE: 1
BACK PAIN: 0
PALPITATIONS: 0
NERVOUS/ANXIOUS: 1
CHILLS: 0
WEAKNESS: 0
POLYDIPSIA: 0
FOCAL WEAKNESS: 0
BLOOD IN STOOL: 0
BRUISES/BLEEDS EASILY: 0
RESPIRATORY NEGATIVE: 1
POLYDIPSIA: 0
COUGH: 0
DEPRESSION: 0
ABDOMINAL PAIN: 0
MYALGIAS: 1
NAUSEA: 0
NEUROLOGICAL NEGATIVE: 1
FEVER: 0
BACK PAIN: 0
PALPITATIONS: 0
HEADACHES: 0
HEARTBURN: 0
FEVER: 0
HEARTBURN: 0
COUGH: 0
DEPRESSION: 0
FLANK PAIN: 0
CARDIOVASCULAR NEGATIVE: 1
COUGH: 0
NERVOUS/ANXIOUS: 1
CHILLS: 0
DIZZINESS: 0
CHILLS: 0
FEVER: 0
PALPITATIONS: 0
NECK PAIN: 0
GASTROINTESTINAL NEGATIVE: 1
FEVER: 0
PALPITATIONS: 0
GASTROINTESTINAL NEGATIVE: 1

## 2023-01-01 ASSESSMENT — COGNITIVE AND FUNCTIONAL STATUS - GENERAL
STANDING UP FROM CHAIR USING ARMS: TOTAL
WALKING IN HOSPITAL ROOM: TOTAL
MOVING FROM LYING ON BACK TO SITTING ON SIDE OF FLAT BED: UNABLE
CLIMB 3 TO 5 STEPS WITH RAILING: TOTAL
SUGGESTED CMS G CODE MODIFIER MOBILITY: CN
MOVING TO AND FROM BED TO CHAIR: UNABLE
TURNING FROM BACK TO SIDE WHILE IN FLAT BAD: UNABLE
MOBILITY SCORE: 6

## 2023-01-01 ASSESSMENT — GAIT ASSESSMENTS: GAIT LEVEL OF ASSIST: UNABLE TO PARTICIPATE

## 2023-01-01 ASSESSMENT — PAIN DESCRIPTION - PAIN TYPE
TYPE: ACUTE PAIN
TYPE: ACUTE PAIN
TYPE: VISCERAL PAIN
TYPE: ACUTE PAIN
TYPE: VISCERAL PAIN
TYPE: ACUTE PAIN

## 2023-01-01 ASSESSMENT — PATIENT HEALTH QUESTIONNAIRE - PHQ9
SUM OF ALL RESPONSES TO PHQ9 QUESTIONS 1 AND 2: 2
2. FEELING DOWN, DEPRESSED, IRRITABLE, OR HOPELESS: SEVERAL DAYS
1. LITTLE INTEREST OR PLEASURE IN DOING THINGS: SEVERAL DAYS
4. FEELING TIRED OR HAVING LITTLE ENERGY: SEVERAL DAYS
3. TROUBLE FALLING OR STAYING ASLEEP OR SLEEPING TOO MUCH: SEVERAL DAYS

## 2023-01-01 ASSESSMENT — FIBROSIS 4 INDEX
FIB4 SCORE: 14.47
FIB4 SCORE: 2.18
FIB4 SCORE: 65.08

## 2023-07-04 PROBLEM — E87.20 METABOLIC ACIDOSIS: Status: ACTIVE | Noted: 2023-01-01

## 2023-07-05 PROBLEM — A41.9 SEPSIS (HCC): Status: ACTIVE | Noted: 2023-01-01

## 2023-07-05 PROBLEM — R65.21 SEPTIC SHOCK (HCC): Status: ACTIVE | Noted: 2023-01-01

## 2023-07-05 PROBLEM — N17.9 ACUTE RENAL FAILURE (HCC): Status: ACTIVE | Noted: 2023-01-01

## 2023-07-05 PROBLEM — E87.29 HIGH ANION GAP METABOLIC ACIDOSIS: Status: ACTIVE | Noted: 2023-01-01

## 2023-07-05 PROBLEM — N18.6 ESRD (END STAGE RENAL DISEASE) (HCC): Status: ACTIVE | Noted: 2023-01-01

## 2023-07-05 PROBLEM — E87.1 HYPONATREMIA: Status: ACTIVE | Noted: 2023-01-01

## 2023-07-05 PROBLEM — D64.9 ANEMIA REQUIRING TRANSFUSIONS: Status: ACTIVE | Noted: 2023-01-01

## 2023-07-05 PROBLEM — G93.40 ENCEPHALOPATHY ACUTE: Status: ACTIVE | Noted: 2023-01-01

## 2023-07-05 NOTE — Clinical Note
1650 Once patient positioned supine from IR table, patient became unresponsive with agonal breathing. Started bagging patient with 100% FiIo2.  Code called  1653 Code called. Code team at bedside. Patient's blood pressure dropping.   1659 300 mcg neosynephrine given IVP by Dr. Schofield  1702 Dr. Segundo speaking with family regarding if family would like to intubate since patient was full code for procedure, suspended DNAR/DNI   1702 Fam elizabeth stated that they wanted to intubate.  1704 Intensivist at head of bed to intubate.   1709 etomidate 20 mg, rocuronium 50 mg, flushed. 300 mcg neosynephrine given by intensivist Dr. Schofield  1711 glidoscope used for intubation- intubated   1712 patient has bed   1717 patient transported to T616 by RN and two Rns.  1725 report given to Jaret SUMMERS at bedside

## 2023-07-05 NOTE — ASSESSMENT & PLAN NOTE
120 at outside hospital  IVF  Repeat chemistries  Goal 6 to 10 improvement over 24 hours, no greater than 10 if possible

## 2023-07-05 NOTE — PROGRESS NOTES
FLORIN INTENSIVIST DIRECT ADMISSION REPORT        Transferring facility: [facility]  Transferring physician: Dr Sarabia  Transferring facility/physician contact number: RTOC    Chief complaint: KAREN, metabolic acidosis    Pertinent history & patient course:   This is a 62-year-old female with a past medical history of metastatic endometrial carcinoma diagnosed in 2021 with reported new metastatic disease.  She is currently taking oral chemotherapy.  She reportedly presented to [facility] emergency department general malai and was found to have a KAREN on top of CKD.    Initial metabolic panel showed a serum sodium of 120, potassium 5.2, chloride of 94, bicarbonate of 8 with an anion gap of 18, BUN of 86 and a creatinine of 5.1 with a recent baseline serum creatinine of 3.5.  Venous blood gas was 7.2/19  Lactate of 3.5    Patient has been given 2 L of crystalloid, started on a bicarbonate infusion and given albumin bolus.    She reportedly had a urinalysis concerning for infection so was started on vancomycin plus ertapenem, she reportedly had a history of ESBL urinary tract infection.    Patient is on room air, no pressors, reportedly making urine    Transferring facility asking for transfer to higher level of care given KAREN    Pertinent imaging & lab results: As above  Code Status: Full per transferring provider, I personally verified with the transferring provider patient's code status and the transferring provider has confirmed this with the patient.  Further work up or recommendations prior to transfer: None     Consultants called prior to transfer and pertinent input from consultants: None  Patient accepted for transfer: Yes  Consultants to be called upon arrival: IMCU hospitalist  Floor requested: Intermediate care unit  ADT order placed for accepted patient: Yes    Please inform the [facility] hospitalist upon assignment of an IMCU bed and then again on arrival of the patient.

## 2023-07-05 NOTE — PROGRESS NOTES
4 Eyes Skin Assessment Completed by Madhu HODGE, RN and Callum LEVY RN.    Head Jaundice  Ears Jaundice  Nose WDL  Mouth WDL  Neck Jaundice  Breast/Chest Scab, Jaundice, and Incision (port on right chest)  Shoulder Blades Redness and Blanching  Spine Redness and Blanching  (R) Arm/Elbow/Hand Redness, Blanching, and Edema  (L) Arm/Elbow/Hand Redness, Blanching, and Edema  Abdomen WDL  Groin Redness and Blanching  Scrotum/Coccyx/Buttocks Redness and Blanching  (R) Leg Bruising  (L) Leg Bruising  (R) Heel/Foot/Toe Redness and Blanching  (L) Heel/Foot/Toe Redness and Blanching          Devices In Places ECG, Blood Pressure Cuff, and Pulse Ox      Interventions In Place Pillows and Q2 Turns    Possible Skin Injury No    Pictures Uploaded Into Epic N/A  Wound Consult Placed N/A  RN Wound Prevention Protocol Ordered No

## 2023-07-05 NOTE — ASSESSMENT & PLAN NOTE
Renal failure in the setting of CKD - likely related to either her metastatic endometrial CA or possibly treatment thereof - continue IVF resuscitation  -100cc/hour LR for 1L - has already received at least 2L before arrival  -Oliguric per patient and outside hospital - strict I+O  -Follow up ABG - consider if bicarb adjunct necessary  -Consider nephrology consult in AM  -Guarded prognosis considering comorbidities

## 2023-07-05 NOTE — ASSESSMENT & PLAN NOTE
Metastatic endometrial carcinoma, per patient and outside hospital chart recent abdominal imaging has been discouraging in terms of size of tumor burden, they were considering restarting infusions instead of her current oral therapies. Seen by [facility] Gyn/ONC in the past

## 2023-07-05 NOTE — CONSULTS
Nephrology History and Physical Consult note    Date of Service  7/5/2023    Requesting Physician: Dr. Speedy Varela MD    Reason for consult: Acute Kidney Injury      Chief Complaint  62 y.o. female admitted 7/5/2023 metastatic endometrial carcinoma with new metastatic disease status post resection and chemotherapy presents to the emergency room with acute kidney injury.    Patient found to abnormal labs as part of routine lab work.  Patient initially presented to outside facility.  At the time labs notable for sodium of 120, potassium 5.2, bicarbonate of 8, anion gap of 18, creatinine of 5.1 from prior baseline of 3.5.  Lactate 3.5.    Patient had been treated for septic shock due to sepsis due to UTI for which she had been on vancomycin plus ertapenem given prior history of ESBL UTI.  Patient ultimately fluid resuscitated and put placed on bicarbonate infusion as well as albumin.    Patient transferred to [facility] for continued care nephrology consulted for further evaluation of acute kidney injury.    Review of Systems  ROS     Physical Exam  Temp:  [35.7 °C (96.3 °F)-36 °C (96.8 °F)] 35.9 °C (96.6 °F)  Pulse:  [] 89  Resp:  [14-27] 15  BP: ()/(47-56) 106/52  SpO2:  [96 %-98 %] 97 %    Physical Exam  GEN:somnolent.  in no acute distress.pale.   HEENT: dry oropharygneal mucous membranes and lips  CV:RRR, normal s1 s2  PULM: clear to auscultation bilaterally  ABD: soft non tender non distended  EXT: warm well perfused, no lower extremity edema    Fluids    Intake/Output Summary (Last 24 hours) at 7/5/2023 1216  Last data filed at 7/5/2023 1045  Gross per 24 hour   Intake 709.58 ml   Output --   Net 709.58 ml       Laboratory  Labs reviewed, pertinent labs below.  Recent Labs     07/05/23  0230   WBC 12.0*   RBC 2.21*   HEMOGLOBIN 5.9*   HEMATOCRIT 19.6*   MCV 88.7   MCH 26.7*   MCHC 30.1*   RDW 68.6*   PLATELETCT 90*   MPV 10.6     Recent Labs     07/05/23  0230   SODIUM 119*   POTASSIUM 4.6    CHLORIDE 86*   CO2 11*   GLUCOSE 185*   BUN 85*   CREATININE 5.07*   CALCIUM 9.6                    Imaging interpreted by radiologist. Imaging reports reviewed with pertinent findings below  No orders to display         Current Facility-Administered Medications   Medication Dose Route Frequency Provider Last Rate Last Admin    senna-docusate (Pericolace Or Senokot S) 8.6-50 MG per tablet 2 Tablet  2 Tablet Oral BID John Milligan M.D.   2 Tablet at 07/05/23 0519    And    polyethylene glycol/lytes (Miralax) PACKET 1 Packet  1 Packet Oral QDAY PRN John Milligan M.D.        And    magnesium hydroxide (Milk Of Magnesia) suspension 30 mL  30 mL Oral QDAY PRN John Milligan M.D.        And    bisacodyl (Dulcolax) suppository 10 mg  10 mg Rectal QDAY PRN John Milligan M.D.        [Held by provider] heparin injection 5,000 Units  5,000 Units Subcutaneous Q8HRS John Milligan M.D.        acetaminophen (Tylenol) tablet 650 mg  650 mg Oral Q6HRS PRN John Milligan M.D.        labetalol (Normodyne/Trandate) injection 10 mg  10 mg Intravenous Q4HRS PRN John Milligan M.D.        insulin GLARGINE (Lantus,Semglee) injection  32 Units Subcutaneous Q EVENING John Milligan M.D.        levothyroxine (Synthroid) tablet 224 mcg  224 mcg Oral AM ES John Milligan M.D.   224 mcg at 07/05/23 0519    rosuvastatin (Crestor) tablet 40 mg  40 mg Oral DAILY John Milligan M.D.   40 mg at 07/05/23 0519    dextrose 10 % BOLUS 25 g  25 g Intravenous Q15 MIN PRN John Milligan M.D.        thiamine (B-1) IVPB 100 mg in 100 mL D5W (premix)  100 mg Intravenous DAILY John Milligan M.D.   Stopped at 07/05/23 0547    meropenem (Merrem) 500 mg in  mL IV-MBP  500 mg Intravenous Q12HRS Speedy Varela M.D.        NS infusion   Intravenous Continuous Speedy Varela M.D. 83 mL/hr at 07/05/23 1030 New Bag at 07/05/23 1030    vancomycin 50 mg/mL oral soln 125 mg  125 mg Oral Q6HRS Speedy Varela M.D.         insulin lispro (HumaLOG,AdmeLOG) injection  2-9 Units Subcutaneous TID AC Speedy Varela M.D.             Assessment/Plan    62 y.o. female admitted 7/5/2023 metastatic endometrial carcinoma with new metastatic disease status post resection and chemotherapy presents to the emergency room with acute kidney injury.    Oliguric Acute Kidney Injury . Likely due to ATN in the setting of  prior septic shock, now resolving. Must exclude obstructive causes in the setting of metastastic disease. Baseline renal function shows Cr 3.5, currently 6.       -Obtain urinalysis as available.   -Recommend imaging of abdomen/pelvis given metastatic malignancy.  - Maintain MAP>65   - Continue broad spectrum antibiotics.   - Dose all medications for patient level of renal function  - Avoid nephrotoxic agents including contrast and NSAIDs  - Encourage adequate hydration.  - Continue to monitor renal function.  Obtain BMP, urine studies including urine protein quantification.  -No immediate need for hemodialysis however will continue to assess on a daily basis    2. DM - Lantus 32 units qHS. Conitnue insulin sliding scale.    3. Anemia - Transfuse packed red blood cells to maintain hemoglobin greater than 7.    4. Anion Gap metabolic Acidosis -treatment of urinary tract infection as above.  Recommend infusion sodium bicarbonate.  Gentle hydration.  Continue to monitor.             Meena Altamirano MD  Nephrology  [facility]

## 2023-07-05 NOTE — H&P
[facility] Internal Medicine History & Physical Note    Date of Service  7/5/2023    [facility] Team: MICHAEL   Attending: Farzana White M.d.  Senior Resident: Dr. Milligan      Primary Care Physician  Padmini Lunsford M.D.    Consultants  nephrology  Infectious disease    Code Status  Full Code    Chief Complaint  No chief complaint on file.      History of Presenting Illness (HPI):   Tena Chaudhry is a 62 y.o. female past medical history diabetes type 2, hyperlipidemia, hypothyroidism, chronic kidney disease, and metastatic endometrial carcinoma previously treated [facility] gyn/onc and infusions, now on oral chemotherapy, who presented 7/5/2023 with acute encephalopathy, acute renal failure and urosepsis transferred from outside hospital [place].    She had complaint of 8 days worsening confusion lack of appetite poor p.o. intake family noticed she has had difference in behavior including confusion, she felt fatigued with low energy, as well as complaint of dysuria and frequency including uncomfortable urination.      [facility] emergency department vitals found to be within normal limits other than borderline tachycardia , laboratories significant for hyponatremia as well as acute renal failure in the setting of CKD with creatinine up to 5.1 from 3.5, BNP normal at 61 with a negative troponin, leukocytosis 13.4, UA with positive nitrates positive leuk esterase  white blood cells per high-powered field and hyaline casts at 30-50.  She had a anion gap metabolic acidosis.  Patient was started on crystalloid resuscitation received 2 L while she was in the emergency department there.  Given her history of ESBL urinary tract infection she was started empirically on ertapenem and vancomycin.  Transferred for higher level of care possible ICU level.      I discussed the plan of care with patient.    Review of Systems  Review of Systems   Constitutional:  Positive for malaise/fatigue. Negative for chills and fever.   HENT:   Negative for hearing loss.    Eyes:  Negative for blurred vision.   Respiratory:  Negative for cough, hemoptysis, shortness of breath and wheezing.    Cardiovascular:  Positive for leg swelling. Negative for chest pain, palpitations and orthopnea.   Gastrointestinal:  Positive for abdominal pain and nausea. Negative for blood in stool, diarrhea, heartburn, melena and vomiting.   Genitourinary:  Positive for dysuria and frequency. Negative for flank pain and hematuria.   Skin:  Negative for rash.   Neurological:  Positive for weakness. Negative for dizziness, speech change, focal weakness, seizures and loss of consciousness.       Past Medical History   has no past medical history on file.    Surgical History   has no past surgical history on file.     Family History  family history is not on file.   Family history reviewed with patient.     Social History  Tobacco: Denies  Alcohol: Denies  Recreational drugs (illegal or prescription): Denies  Employment: Previously a , has slowed down in the setting of her cancer  Living Situation: Home in [place] with family  Recent Travel: Other than [place] and [place] - denies  Primary Care Provider: Not Reviewed  Other (stressors, spirituality, exposures): Denies    Allergies  No Known Allergies    Medications  Prior to Admission Medications   Prescriptions Last Dose Informant Patient Reported? Taking?   ferrous sulfate 325 (65 Fe) MG tablet  Patient Yes No   Sig: Take 325 mg by mouth every day.   glimepiride (AMARYL) 4 MG Tab  Patient Yes No   Sig: Take 4 mg by mouth every day.   insulin glargine (LANTUS) 100 UNIT/ML Solution  Patient Yes No   Sig: Inject 64 Units under the skin every evening.   levothyroxine (SYNTHROID) 112 MCG Tab  Patient Yes No   Sig: Take 224 mcg by mouth every morning on an empty stomach.   liraglutide (VICTOZA) 18 MG/3ML Solution Pen-injector  Patient Yes No   Sig: Inject 1.8 mg under the skin every day.   metFORMIN (GLUCOPHAGE) 500 MG Tab   Patient Yes No   Sig: Take 1,000 mg by mouth 2 times a day with meals. 2 tabs = 1000 mg   polyethylene glycol/lytes (MIRALAX) 17 g Pack   No No   Sig: Mix 1 Packet with 8oz. of liquid and drink 1 time a day as needed (if sennosides and docusate ineffective after 24 hours).   rosuvastatin (CRESTOR) 40 MG tablet  Patient Yes No   Sig: Take 40 mg by mouth every day.      Facility-Administered Medications: None       Physical Exam  Temp:  [35.7 °C (96.3 °F)] 35.7 °C (96.3 °F)  Pulse:  [] 93  Resp:  [25-27] 25  BP: (105)/(54) 105/54  SpO2:  [98 %] 98 %  Blood Pressure: 105/54   Temperature: (!) 35.7 °C (96.3 °F)   Pulse: 93   Respiration: (!) 25   Pulse Oximetry: 98 %       Physical Exam  Constitutional:       General: She is not in acute distress.     Appearance: Normal appearance.      Comments: Appears tired with yellowish colored skin. Laying in bed no appearance discomfort. Answers questions but does have some repetitive answers and slightly slow processing, occasionally confused, sometimes limited historian. Alert and oriented x4 formally.   HENT:      Nose: No congestion.      Mouth/Throat:      Mouth: Mucous membranes are dry.      Pharynx: Oropharynx is clear. No oropharyngeal exudate.   Eyes:      Pupils: Pupils are equal, round, and reactive to light.   Cardiovascular:      Rate and Rhythm: Normal rate and regular rhythm.      Heart sounds: No murmur heard.     No gallop.   Pulmonary:      Effort: Pulmonary effort is normal. No respiratory distress.      Breath sounds: Normal breath sounds. No wheezing or rales.   Abdominal:      General: There is no distension.      Palpations: Abdomen is soft.      Tenderness: There is no abdominal tenderness. There is no guarding.   Musculoskeletal:         General: No tenderness. Normal range of motion.      Cervical back: Normal range of motion. No rigidity.      Right lower leg: Edema present.      Left lower leg: Edema present.   Skin:     Findings: No bruising,  erythema or rash.   Neurological:      General: No focal deficit present.      Mental Status: She is oriented to person, place, and time.   Psychiatric:         Mood and Affect: Mood normal.         Judgment: Judgment normal.         Laboratory:          No results for input(s): ALTSGPT, ASTSGOT, ALKPHOSPHAT, TBILIRUBIN, DBILIRUBIN, GAMMAGT, AMYLASE, LIPASE, ALB, PREALBUMIN, GLUCOSE in the last 72 hours.      No results for input(s): NTPROBNP in the last 72 hours.      No results for input(s): TROPONINT in the last 72 hours.    Imaging:  No orders to display       EKG:  My impression is: normal sinus rhythm on rhythm strip from outside hospital    Assessment/Plan:  Problem Representation:   I anticipate this patient will require at least two midnights for appropriate medical management, necessitating inpatient admission because acute renal failure metastatic cancer    Patient will need a Intermediate Care (Adult and Pediatrics) bed on MEDICAL service .  The need is secondary to acute renal failure metastatic cancer.    * High anion gap metabolic acidosis  Assessment & Plan  High anion gap metabolic acidosis  Likely 2/2 her lactic acidosis, likely dehydration from her recent poor po intake  -Continue IVF resus  -Trend lactic  -Repeat chemistry    Encephalopathy acute  Assessment & Plan  Differential broad, acute renal failure, side effect of chemotherapy, dehydration 2/2 poor po intake, metabolic acidosis, sepsis  CT head outside facility was negative.  -Treat possible causative underlying disease e.g.:  -Holding chemotherapeudics currently  -IVF resus, treatment of renal disease and metabolic acidosis  -Routine delirium precautions    Sepsis (HCC)  Assessment & Plan  This is Sepsis Present on admission  SIRS criteria identified on my evaluation include: Tachycardia, with heart rate greater than 90 BPM  Source is urinary  Sepsis protocol initiated  Fluid resuscitation ordered per protocol  Crystalloid Fluid  Administration: Fluid resuscitation ordered per standard protocol - 30 mL/kg per current or ideal body weight  IV antibiotics as appropriate for source of sepsis  Reassessment: I have reassessed the patient's hemodynamic status    Urosepsis - with history of ESBL urinary infection, received vancomycin+ertepenem at outside hospital, will initiate empiric meropenem for ESBL coverage  Continue IVF resus  Follow up stat ABG results  Repeat UA   Consider ID consult in AM      Acute renal failure (HCC)- (present on admission)  Assessment & Plan  Renal failure in the setting of CKD - likely related to either her metastatic endometrial CA or possibly treatment thereof - continue IVF resuscitation  -100cc/hour LR for 1L - has already received at least 2L before arrival  -Oliguric per patient and outside hospital - strict I+O  -Follow up ABG - consider if bicarb adjunct necessary  -Consider nephrology consult in AM  -Guarded prognosis considering comorbidities    Endometrial carcinoma (HCC)- (present on admission)  Assessment & Plan  Metastatic endometrial carcinoma, per patient and outside hospital chart recent abdominal imaging has been discouraging in terms of size of tumor burden, they were considering restarting infusions instead of her current oral therapies. Seen by [facility] Gyn/ONC in the past    Hypothyroidism- (present on admission)  Assessment & Plan  Resume home synthroid    HLD (hyperlipidemia)- (present on admission)  Assessment & Plan  Resume home statin    DM (diabetes mellitus) (HCC)- (present on admission)  Assessment & Plan  Glargine 64 at home as well as multiple oral therapies  -Holding home DM oral medications  -start with 32 glargine qPM - likely will need to titrate back up  -ISS and hypoglycemia protocol        VTE prophylaxis: heparin ppx

## 2023-07-05 NOTE — Clinical Note
Noticed patient had nose bleed, oxygen saturation dropped. Made Dr. Segundo aware. 10 L SFM placed on patient, oxygen saturation 96%

## 2023-07-05 NOTE — PROGRESS NOTES
Patient seen and examined today.  Data, Medication data reviewed.  Case discussed with nursing as available.  Plan of Care reviewed with patient and notified of changes.   7/5  Patient seen in follow-up after admission this morning  Consulted nephrology  Broad-spectrum antibiotics, cultures from outlying facility currently pending  Ongoing IV fluid support  Blood transfusion  Additional labs ordered and a.m. labs requested  Medically complex high risk  Discussed with nephrology    Full follow-up to follow in a.m.

## 2023-07-05 NOTE — ASSESSMENT & PLAN NOTE
Glargine 64 at home as well as multiple oral therapies  -Holding home DM oral medications  -start with 32 glargine qPM - likely will need to titrate back up  -ISS and hypoglycemia protocol

## 2023-07-05 NOTE — CARE PLAN
The patient is Watcher - Medium risk of patient condition declining or worsening    Shift Goals  Clinical Goals: improve kidney function  Patient Goals: sleep  Family Goals: heriberto    Progress made toward(s) clinical / shift goals:    Problem: Knowledge Deficit - Standard  Goal: Patient and family/care givers will demonstrate understanding of plan of care, disease process/condition, diagnostic tests and medications  Outcome: Progressing     Problem: Skin Integrity  Goal: Skin integrity is maintained or improved  Outcome: Progressing     Problem: Pain - Standard  Goal: Alleviation of pain or a reduction in pain to the patient’s comfort goal  Outcome: Progressing       Patient is not progressing towards the following goals:none

## 2023-07-05 NOTE — ASSESSMENT & PLAN NOTE
High anion gap metabolic acidosis  Likely 2/2 her lactic acidosis, likely dehydration from her recent poor po intake  -Continue IVF resus  -Trend lactic  -Repeat chemistry

## 2023-07-05 NOTE — ASSESSMENT & PLAN NOTE
Differential broad, acute renal failure, side effect of chemotherapy, dehydration 2/2 poor po intake, metabolic acidosis, sepsis  CT head outside facility was negative.  -Treat possible causative underlying disease e.g.:  -Holding chemotherapeudics currently  -IVF resus, treatment of renal disease and metabolic acidosis  -Routine delirium precautions

## 2023-07-05 NOTE — PROGRESS NOTES
Med rec complete per patient's home pharmacy  Patient unable to participate  Unable to reach family at this time

## 2023-07-05 NOTE — ASSESSMENT & PLAN NOTE
This is Sepsis Present on admission  SIRS criteria identified on my evaluation include: Tachycardia, with heart rate greater than 90 BPM  Source is urinary  Sepsis protocol initiated  Fluid resuscitation ordered per protocol  Crystalloid Fluid Administration: Fluid resuscitation ordered per standard protocol - 30 mL/kg per current or ideal body weight  IV antibiotics as appropriate for source of sepsis  Reassessment: I have reassessed the patient's hemodynamic status    Urosepsis - with history of ESBL urinary infection, received vancomycin+ertepenem at outside hospital, will initiate empiric meropenem for ESBL coverage  Continue IVF resus  Follow up stat ABG results  Repeat UA   Consider ID consult in AM

## 2023-07-06 NOTE — ASSESSMENT & PLAN NOTE
On prior CKD  Possibly dehydration with concurrent infection  Versus progressive tumor burden with obstruction  Ongoing fluid support  Avoid nephrotoxins  Kidney function continues to worsen, patient and son agreeable to nephrostomy tube  IR consult placed for R nephrostomy tube vs ureter stent, NPO IR to place today  Nephrology following

## 2023-07-06 NOTE — PROGRESS NOTES
Hospital Medicine Daily Progress Note    Date of Service  7/6/2023    Chief Complaint  Tena Chaudhry is a 62 y.o. female admitted 7/5/2023 with weakness, encephalopathy, UTI from an outlying facility    Hospital Course  Tena Chaudhry is a 62 y.o. female past medical history diabetes type 2, hyperlipidemia, hypothyroidism, chronic kidney disease, and advanced metastatic endometrial carcinoma previously treated [facility] gyn/onc and infusions, now on oral chemotherapy, who presented 7/5/2023 with acute encephalopathy, acute renal failure and urosepsis transferred from outside hospital [place].  She had complaint of 8 days worsening confusion lack of appetite poor p.o. intake family noticed she has had difference in behavior including confusion, she felt fatigued with low energy, as well as complaint of dysuria and frequency including uncomfortable urination.  [facility] emergency department vitals found to be within normal limits other than borderline tachycardia , laboratories significant for hyponatremia as well as acute renal failure in the setting of CKD with creatinine up to 5.1 from 3.5, BNP normal at 61 with a negative troponin, leukocytosis 13.4, UA with positive nitrates positive leuk esterase  white blood cells per high-powered field and hyaline casts at 30-50.  She had a anion gap metabolic acidosis.  Patient was started on crystalloid resuscitation received 2 L while she was in the emergency department there.  Given her history of ESBL urinary tract infection she was started empirically on ertapenem and vancomycin.  Transferred for higher level of care, nephrology evaluation    Interval Problem Update  Patient seen and examined today.  Data, Medication data reviewed.  Case discussed with nursing as available.  Plan of Care reviewed with patient and notified of changes.   7/6 patient has somewhat improved, she is less lethargic, currently afebrile, heart rate around  , respiration  unlabored, the patient is saturating 96% on room air, blood pressure in the 100s to 110 over 60s,  Laboratory data otherwise 6-14.2, hemoglobin 6.9, being transfused, platelets 76,  Sodium 123, potassium 4.1, chloride 89, bicarb 14, BUN 85, creatinine 5.3 fairly unchanged, albumin 2.3,  Cultures so far negative, attempting to collect cultures ports from [place]  Lengthy discussion with son in regards to the CT findings and overall worsening status with ongoing renal function decline  Nephrology follow-up pending  I have discussed this patient's plan of care and discharge plan at IDT rounds today with Case Management, Nursing, Nursing leadership, and other members of the IDT team.    Consultants/Specialty  critical care and nephrology    Code Status  Full Code    Disposition  The patient is not medically cleared for discharge to home or a post-acute facility.  Anticipate discharge to: home with close outpatient follow-up    I have placed the appropriate orders for post-discharge needs.    Review of Systems  Review of Systems   Constitutional:  Positive for malaise/fatigue. Negative for chills and fever.   HENT: Negative.     Eyes: Negative.    Respiratory: Negative.  Negative for cough.    Cardiovascular: Negative.  Negative for chest pain and palpitations.   Gastrointestinal: Negative.  Negative for heartburn, nausea and vomiting.   Genitourinary: Negative.  Negative for dysuria and frequency.   Musculoskeletal:  Positive for myalgias. Negative for back pain and neck pain.   Skin: Negative.  Negative for itching and rash.   Neurological: Negative.  Negative for dizziness, focal weakness, weakness and headaches.   Endo/Heme/Allergies: Negative.  Negative for polydipsia. Does not bruise/bleed easily.   Psychiatric/Behavioral:  Negative for depression. The patient is nervous/anxious.         Physical Exam  Temp:  [35.9 °C (96.6 °F)-36.3 °C (97.4 °F)] 36.3 °C (97.4 °F)  Pulse:  [] 102  Resp:  [13-28] 17  BP:  ()/(47-59) 116/56  SpO2:  [95 %-98 %] 96 %    Physical Exam  Vitals and nursing note reviewed.   Constitutional:       Appearance: She is well-developed. She is ill-appearing. She is not diaphoretic.   HENT:      Head: Normocephalic and atraumatic.      Nose: Nose normal.   Eyes:      Conjunctiva/sclera: Conjunctivae normal.      Pupils: Pupils are equal, round, and reactive to light.   Neck:      Thyroid: No thyromegaly.      Vascular: No JVD.   Cardiovascular:      Rate and Rhythm: Normal rate and regular rhythm.      Heart sounds: Normal heart sounds.      No friction rub. No gallop.   Pulmonary:      Effort: Pulmonary effort is normal.      Breath sounds: Normal breath sounds. No wheezing or rales.   Abdominal:      General: Bowel sounds are normal. There is no distension.      Palpations: Abdomen is soft. There is no mass.      Tenderness: There is no abdominal tenderness. There is no guarding or rebound.   Genitourinary:     Comments: Dark urine output, Enriquez  Musculoskeletal:         General: Swelling present. No tenderness. Normal range of motion.      Cervical back: Normal range of motion and neck supple.   Lymphadenopathy:      Cervical: No cervical adenopathy.   Skin:     General: Skin is warm and dry.      Coloration: Skin is pale.   Neurological:      Mental Status: She is alert and oriented to person, place, and time.      Cranial Nerves: No cranial nerve deficit.      Motor: Weakness present.   Psychiatric:         Behavior: Behavior normal.         Fluids    Intake/Output Summary (Last 24 hours) at 7/6/2023 1604  Last data filed at 7/6/2023 1233  Gross per 24 hour   Intake 2220.88 ml   Output 300 ml   Net 1920.88 ml       Laboratory  Recent Labs     07/05/23  0230 07/06/23  0738   WBC 12.0* 14.2*   RBC 2.21* 2.57*   HEMOGLOBIN 5.9* 6.9*   HEMATOCRIT 19.6* 22.9*   MCV 88.7 89.1   MCH 26.7* 26.8*   MCHC 30.1* 30.1*   RDW 68.6* 66.9*   PLATELETCT 90* 76*   MPV 10.6 10.9     Recent Labs      07/05/23  0230 07/06/23  0738   SODIUM 119* 123*   POTASSIUM 4.6 4.1   CHLORIDE 86* 89*   CO2 11* 14*   GLUCOSE 185* 139*   BUN 85* 85*   CREATININE 5.07* 5.30*   CALCIUM 9.6 9.5                   Imaging  CT-ABDOMEN-PELVIS W/O   Final Result      1.  Large complex right pelvic mass measuring at least 11.8 x 11.8 x 16.9 cm which could be ovarian malignancy however suboptimally evaluated without contrast.   2.  Abdominal and pelvic lymphadenopathy in keeping with chel metastases.   3.  Peritoneal metastases and trace ascites.   4.  Left posterolateral abdominal wall with continuous metastasis.   5.  10 mm lingular pulmonary nodule could be metastatic.   6.  Trace right pleural effusion.   7.  Mild right hydroureteronephrosis likely related to partial obstruction of the right ureter from the right pelvic mass   8.  Hepatosplenomegaly and probable hepatic steatosis.              Assessment/Plan  * Septic shock (HCC)- (present on admission)  Assessment & Plan  S/p treatment, ongoing treatment for UTI  Awaiting culture results  History of ESBL  History of C. difficile, currently negative    Anemia requiring transfusions  Assessment & Plan  No current overt blood loss noted  Transfuse as indicated  Monitor    ESRD (end stage renal disease) (HCC)- (present on admission)  Assessment & Plan  Nephrology consulting    Hyponatremia- (present on admission)  Assessment & Plan  Multifactorial with free water overload, renal failure    Encephalopathy acute- (present on admission)  Assessment & Plan  Multifactorial, underlying malignancy, renal failure,  Overall nonfocal    Acute renal failure (HCC)- (present on admission)  Assessment & Plan  On prior CKD  Possibly dehydration with concurrent infection  Versus progressive tumor burden with obstruction  Nephrology consulting  Ongoing fluid support  Avoid nephrotoxins      High anion gap metabolic acidosis- (present on admission)  Assessment & Plan  Improving with supportive  care    Endometrial carcinoma (HCC)- (present on admission)  Assessment & Plan  Advanced, follow-up CT noted  Abdominal carcinomatosis, metastatic disease  Patient generally followed at [facility]  We will involve palliative care, given the patient's progressive decline  Family struggles with remote location and limited assistance    Hypothyroidism- (present on admission)  Assessment & Plan  Continue replacement, recheck TFTs    HLD (hyperlipidemia)- (present on admission)  Assessment & Plan  History of, currently not a candidate for treatment    DM (diabetes mellitus) (HCC)- (present on admission)  Assessment & Plan  Ongoing glycemic control, monitoring       Plan  Ongoing treatment with IV fluids,  Antibiotics, pending outlying facility culture report  Monitor renal function closely, avoid nephrotoxins  Continue symptomatic treatment  Consult palliative care in light of the patient's overall progressive cancer diagnosis and failure to thrive, declining status  See orders  Patient is has a high medical complexity, complex decision making and is at high risk for complication, morbidity, and mortality.  My total time spent caring for the patient on the day of the encounter was 56 minutes.   This does not include time spent on separately billable procedures/tests.    VTE prophylaxis: SCDs/TEDs    I have performed a physical exam and reviewed and updated ROS and Plan today (7/6/2023). In review of yesterday's note (7/5/2023), there are no changes except as documented above.      Please note that this dictation was created using voice recognition software. I have made every reasonable attempt to correct obvious errors, but I expect that there are errors of grammar and possibly context that I did not discover before finalizing the note.

## 2023-07-06 NOTE — DIETARY
Nutrition Services:     Pt noted to have BMI > 40 now. BMI upon admit was 38.09. Wt gain most likely r/t extra fluids onboard. Pt is +2.5 L of fluid. She also has 2+ pitting edema to BLEs. With resolution of fluid status, BMI most likely will be < 40.     RD will follow per dept protocol.

## 2023-07-06 NOTE — CARE PLAN
The patient is Watcher - Medium risk of patient condition declining or worsening    Shift Goals  Clinical Goals: monitor for pain and general needs  Patient Goals: sleep  Family Goals: pt to go home    Progress made toward(s) clinical / shift goals:    Problem: Knowledge Deficit - Standard  Goal: Patient and family/care givers will demonstrate understanding of plan of care, disease process/condition, diagnostic tests and medications  Outcome: Progressing     Problem: Skin Integrity  Goal: Skin integrity is maintained or improved  Outcome: Progressing     Problem: Pain - Standard  Goal: Alleviation of pain or a reduction in pain to the patient’s comfort goal  Outcome: Progressing       Patient is not progressing towards the following goals:none

## 2023-07-06 NOTE — PROGRESS NOTES
Pt to [id number] with tranport. Monitor room notified. All belongings sent with pt and son. Report given to KRISTOPHER Claudio.

## 2023-07-06 NOTE — ASSESSMENT & PLAN NOTE
S/p treatment, ongoing treatment for UTI  Awaiting culture results  History of ESBL  History of C. difficile, currently negative  Deescalate meropenem to ceftriaxone for now

## 2023-07-06 NOTE — CARE PLAN
The patient is Watcher - Medium risk of patient condition declining or worsening    Shift Goals  Clinical Goals: improve kidney function  Patient Goals: sleep  Family Goals: heriberto    Progress made toward(s) clinical / shift goals:    Problem: Skin Integrity  Goal: Skin integrity is maintained or improved  Outcome: Progressing  Note: Interventions in place.     Problem: Pain - Standard  Goal: Alleviation of pain or a reduction in pain to the patient’s comfort goal  Outcome: Progressing  Note: Addressed with tylenol and repositioning.

## 2023-07-06 NOTE — ASSESSMENT & PLAN NOTE
Advanced, follow-up CT noted  Stage 4 with abdominal carcinomatosis, metastatic disease  Patient generally followed at [facility]  We will involve palliative care, given the patient's progressive decline  Family struggles with remote location and limited assistance  Gyn oncology consulted, recommend nephrostomy tube via IR if kidney function worsens  Patient and son interested in continuing chemotherapy in the future  Palliative care following  Spoke with Dr Mcginnis, partner of patient's oncologist Dr Terrell at [facility], she requests transfer of patient to [facility] in [facility] for oncology care; transfer center notified, pending transfer

## 2023-07-06 NOTE — PROGRESS NOTES
4 Eyes Skin Assessment Completed by KRISTOPHER Claudio and KRISTOPHER Suh.    Head WDL  Ears WDL  Nose WDL  Mouth WDL  Neck WDL  Breast/Chest Redness  Shoulder Blades WDL  Spine WDL  (R) Arm/Elbow/Hand WDL  (L) Arm/Elbow/Hand WDL  Abdomen Bruising  Groin Redness and Excoriation  Scrotum/Coccyx/Buttocks Redness and Blanching  (R) Leg Swelling and Edema  (L) Leg Swelling and Edema  (R) Heel/Foot/Toe Edema  (L) Heel/Foot/Toe Edema          Devices In Places Enriquez      Interventions In Place Heel Mepilex, Waffle Overlay, TAP System, Q2 Turns, Barrier Cream, and Heels Loaded W/Pillows    Possible Skin Injury No    Pictures Uploaded Into Epic N/A  Wound Consult Placed N/A  RN Wound Prevention Protocol Ordered Yes

## 2023-07-07 PROBLEM — Z71.89 ADVANCED CARE PLANNING/COUNSELING DISCUSSION: Status: ACTIVE | Noted: 2023-01-01

## 2023-07-07 NOTE — CARE PLAN
The patient is Stable - Low risk of patient condition declining or worsening    Shift Goals  Clinical Goals: maintain skin integrity, pain management  Patient Goals: rest, comfort  Family Goals: comfort    Progress made toward(s) clinical / shift goals:    Problem: Skin Integrity  Goal: Skin integrity is maintained or improved  Outcome: Progressing     Problem: Pain - Standard  Goal: Alleviation of pain or a reduction in pain to the patient’s comfort goal  Outcome: Progressing     Problem: Fall Risk  Goal: Patient will remain free from falls  Outcome: Progressing     Patient arrived to unit early this afternoon and has been drowsy and lethargic for the remainder of this shift. Falls care plan in place as well as skin integrity. Waffle overlay in place on mattress; Q2 turns initiated, preventative foam dressings placed to heels. Oriented to unit, room, call bell. Questions/concerns addressed with family members at bedside.    Patient is not progressing towards the following goals:

## 2023-07-07 NOTE — PROGRESS NOTES
"NEPHROLOGY PROGRESS NOTE       Subjective : no overnight events.         HPI:  Tena Chaudhry is a 62 y.o. female admitted 7/5/2023 metastatic endometrial carcinoma with new metastatic disease status post resection and chemotherapy presents to the emergency room with acute kidney injury.    Sujbective - no overnight events.       No past medical history on file.    [unfilled]     No Known Allergies    ROS    BP 90/45   Pulse (!) 108   Temp 36.8 °C (98.2 °F) (Temporal)   Resp 16   Ht 1.6 m (5' 3\")   Wt 104 kg (228 lb 13.4 oz)   SpO2 95%   BMI 40.54 kg/m²     Physical Exam  GEN: alert and oriented. In no acute distress.   HEENT: moist oropharyngeal mucous membranes  CV:RRR  PULM: clear to auscultation bilaterally  ABD: soft non tender non distended  EXT: warm well perfused, no lower extremity edema.    Labs reviewed.  Recent Labs     07/05/23 0230 07/06/23 0738 07/07/23 0410   ALBUMIN 2.5* 2.3* 2.1*   SODIUM 119* 123* 126*   POTASSIUM 4.6 4.1 4.8   CHLORIDE 86* 89* 94*   CO2 11* 14* 13*   BUN 85* 85* 88*   CREATININE 5.07* 5.30* 5.09*   PHOSPHORUS  --  4.1 4.7*       Lab Results   Component Value Date/Time    WBC 16.6 (H) 07/07/2023 04:10 AM    RBC 2.82 (L) 07/07/2023 04:10 AM    HEMOGLOBIN 7.6 (L) 07/07/2023 04:10 AM    HEMATOCRIT 24.3 (L) 07/07/2023 04:10 AM    MCV 86.2 07/07/2023 04:10 AM    MCH 27.0 07/07/2023 04:10 AM    MCHC 31.3 (L) 07/07/2023 04:10 AM    MPV 11.1 07/07/2023 04:10 AM      Recent Labs     07/05/23  0230 07/06/23  0738 07/07/23  0410   WBC 12.0* 14.2* 16.6*   RBC 2.21* 2.57* 2.82*   HEMOGLOBIN 5.9* 6.9* 7.6*   HEMATOCRIT 19.6* 22.9* 24.3*   MCV 88.7 89.1 86.2   MCH 26.7* 26.8* 27.0   MCHC 30.1* 30.1* 31.3*   RDW 68.6* 66.9* 64.4*   PLATELETCT 90* 76* 85*   MPV 10.6 10.9 11.1     Recent Labs     07/05/23  0230 07/06/23  0738 07/07/23  0410   SODIUM 119* 123* 126*   POTASSIUM 4.6 4.1 4.8   CHLORIDE 86* 89* 94*   CO2 11* 14* 13*   GLUCOSE 185* 139* 87   BUN 85* 85* 88*   CREATININE 5.07* " 5.30* 5.09*   CALCIUM 9.6 9.5 9.2     URINALYSIS:  No results found for: COLORURINE, CLARITY, SPECGRAVITY, PHURINE, KETONES, PROTEINURIN, BILIRUBINUR, UROBILU, NITRITE, LEUKESTERAS, OCCULTBLOOD  UPC  No results found for: TOTPROTUR No results found for: CREATININEU    Imaging report(s) reviewed  OUTSIDE IMAGES-CT HEAD   Final Result      CT-ABDOMEN-PELVIS W/O   Final Result      1.  Large complex right pelvic mass measuring at least 11.8 x 11.8 x 16.9 cm which could be ovarian malignancy however suboptimally evaluated without contrast.   2.  Abdominal and pelvic lymphadenopathy in keeping with chel metastases.   3.  Peritoneal metastases and trace ascites.   4.  Left posterolateral abdominal wall with continuous metastasis.   5.  10 mm lingular pulmonary nodule could be metastatic.   6.  Trace right pleural effusion.   7.  Mild right hydroureteronephrosis likely related to partial obstruction of the right ureter from the right pelvic mass   8.  Hepatosplenomegaly and probable hepatic steatosis.               Assessment:  Tena Chaudhry is a 62 y.o. female admitted 7/5/2023 metastatic endometrial carcinoma with new metastatic disease status post resection and chemotherapy presents to the emergency room with acute kidney injury.     Oliguric Acute Kidney Injury . Likely due to ATN in the setting of  prior septic shock, now resolving. Also  may have obstructive causes in the setting of metastastic disease. Baseline renal function shows Cr 3.5, currently 6.        -Recommend imaging of abdomen/pelvis given metastatic malignancy.  - Maintain MAP>65   - Continue broad spectrum antibiotics.   - Dose all medications for patient level of renal function  - Avoid nephrotoxic agents including contrast and NSAIDs  - Encourage adequate hydration.  - Continue to monitor renal function.  Obtain BMP, urine studies including urine protein quantification.  -No immediate need for hemodialysis however will continue to assess on a  daily basis  - Consider palliative care recommendations. Poor candidate for longterm dialysis   given malignancy.    2. DM - Lantus 32 units qHS. Conitnue insulin sliding scale.     3. Anemia - Transfuse packed red blood cells to maintain hemoglobin greater than 7.     4. Anion Gap metabolic Acidosis -treatment of urinary tract infection as above.  Recommend infusion sodium bicarbonate.  Gentle hydration.  Continue to monitor.            Meena Altamirano MD  Nephrology  [facility]

## 2023-07-07 NOTE — PROGRESS NOTES
"NEPHROLOGY PROGRESS NOTE        HPI:  Tena Chaudhry is a 62 y.o. female admitted 7/5/2023 metastatic endometrial carcinoma with new metastatic disease status post resection and chemotherapy presents to the emergency room with acute kidney injury.    7/6Sujbective - no overnight events.     7/7: no overnight events       ROS    BP 90/45   Pulse (!) 108   Temp 36.8 °C (98.2 °F) (Temporal)   Resp 16   Ht 1.6 m (5' 3\")   Wt 104 kg (228 lb 13.4 oz)   SpO2 95%   BMI 40.54 kg/m²     Physical Exam  GEN: alert and oriented. In no acute distress.   HEENT: moist oropharyngeal mucous membranes  CV:RRR  PULM: clear to auscultation bilaterally  ABD: soft non tender non distended  EXT: warm well perfused, no lower extremity edema.    Labs reviewed.  Recent Labs     07/05/23 0230 07/06/23 0738 07/07/23  0410   ALBUMIN 2.5* 2.3* 2.1*   SODIUM 119* 123* 126*   POTASSIUM 4.6 4.1 4.8   CHLORIDE 86* 89* 94*   CO2 11* 14* 13*   BUN 85* 85* 88*   CREATININE 5.07* 5.30* 5.09*   PHOSPHORUS  --  4.1 4.7*         Lab Results   Component Value Date/Time    WBC 16.6 (H) 07/07/2023 04:10 AM    RBC 2.82 (L) 07/07/2023 04:10 AM    HEMOGLOBIN 7.6 (L) 07/07/2023 04:10 AM    HEMATOCRIT 24.3 (L) 07/07/2023 04:10 AM    MCV 86.2 07/07/2023 04:10 AM    MCH 27.0 07/07/2023 04:10 AM    MCHC 31.3 (L) 07/07/2023 04:10 AM    MPV 11.1 07/07/2023 04:10 AM      Recent Labs     07/05/23  0230 07/06/23  0738 07/07/23  0410   WBC 12.0* 14.2* 16.6*   RBC 2.21* 2.57* 2.82*   HEMOGLOBIN 5.9* 6.9* 7.6*   HEMATOCRIT 19.6* 22.9* 24.3*   MCV 88.7 89.1 86.2   MCH 26.7* 26.8* 27.0   MCHC 30.1* 30.1* 31.3*   RDW 68.6* 66.9* 64.4*   PLATELETCT 90* 76* 85*   MPV 10.6 10.9 11.1       Recent Labs     07/05/23  0230 07/06/23  0738 07/07/23  0410   SODIUM 119* 123* 126*   POTASSIUM 4.6 4.1 4.8   CHLORIDE 86* 89* 94*   CO2 11* 14* 13*   GLUCOSE 185* 139* 87   BUN 85* 85* 88*   CREATININE 5.07* 5.30* 5.09*   CALCIUM 9.6 9.5 9.2       URINALYSIS:  No results found for: " COLORURINE, CLARITY, SPECGRAVITY, PHURINE, KETONES, PROTEINURIN, BILIRUBINUR, UROBILU, NITRITE, LEUKESTERAS, OCCULTBLOOD  UPC  No results found for: TOTPROTUR No results found for: CREATININEU    Imaging report(s) reviewed  OUTSIDE IMAGES-CT HEAD   Final Result      CT-ABDOMEN-PELVIS W/O   Final Result      1.  Large complex right pelvic mass measuring at least 11.8 x 11.8 x 16.9 cm which could be ovarian malignancy however suboptimally evaluated without contrast.   2.  Abdominal and pelvic lymphadenopathy in keeping with chel metastases.   3.  Peritoneal metastases and trace ascites.   4.  Left posterolateral abdominal wall with continuous metastasis.   5.  10 mm lingular pulmonary nodule could be metastatic.   6.  Trace right pleural effusion.   7.  Mild right hydroureteronephrosis likely related to partial obstruction of the right ureter from the right pelvic mass   8.  Hepatosplenomegaly and probable hepatic steatosis.               Assessment:  Tena Chaudhry is a 62 y.o. female admitted 7/5/2023 metastatic endometrial carcinoma with new metastatic disease status post resection and chemotherapy presents to the emergency room with acute kidney injury.     Oliguric Acute Kidney Injury . Likely due to ATN in the setting of  prior septic shock, now resolving. Also  may have obstructive causes in the setting of metastastic disease. Baseline renal function shows Cr 3.5, currently 5        -Recommend imaging of abdomen/pelvis given metastatic malignancy.  - Maintain MAP>65   - Continue broad spectrum antibiotics.   - Dose all medications for patient level of renal function  - Avoid nephrotoxic agents including contrast and NSAIDs  - Encourage adequate hydration.  - Continue to monitor renal function.  Obtain BMP, urine studies including urine protein quantification.  -No immediate need for hemodialysis however will continue to assess on a daily basis  - Consider palliative care recommendations. Poor candidate for  longterm dialysis   given malignancy. Discussed with patient and family extensively on 7/7/23  -Recommend continued goals of care conversations to guide treatment.      2. DM - Lantus 32 units qHS. Conitnue insulin sliding scale.     3. Anemia - Transfuse packed red blood cells to maintain hemoglobin greater than 7.     4. Anion Gap metabolic Acidosis -treatment of urinary tract infection as above.  Recommend infusion sodium bicarbonate.  Gentle hydration.  Continue to monitor.     5. Hyponatremia - improving. encourage po intake. Avoid salt tablets. Continue to monitor.          Meena Altamirano MD  Nephrology  [facility]

## 2023-07-07 NOTE — CONSULTS
Inpatient Palliative Medicine Evaluation     Tena Chaudhry  62 y.o. female  [id number]    Location: [facility]  PCP: Padmini Lunsford M.D.  Referral Source: Eb Dominguez M.d.     Reason for palliative medicine consultation and/or visit: advance care planning       Assessment and Plan:     GOAL(S) OF CARE = LONGEVITY without resuscitation.    SYMPTOMS ETIOLOGY/CAUSES =  multifactorial weakness and lethargy 2/2 cancer, sepsis, an poor intake    PROGNOSIS =   - YES technically hospice-eligible = widespread CA, low function pps=50%<70% ECOG3, progressively gyn cancer despite on 3rd line treatment, >10% weight loss in 6mo (259==>228 pounds), ALB=2.1-2.5  - NOT YET hospice-appropriate = patient is planning to discuss with [facility] oncology about further treatment options and is not quite ready for hospice care    CODE STATUS = DNAR DNI  7/7 code status update per my discussion with patient and family      ADVANCE CARE PLANNING =   Reviewed salient recent history.  Provided medical updates.  Disease trajectory education: now late stage, with low function & reduced treatment tolerance.  Referred patient and family to discuss with Medical Records about forwarding imaging access to [facility].    PALLIATIVE CARE TEAM INTERVENTIONS =   Advance care planning.  CODE STATUS clarification  GOC clarification: seeking more treatment option discussion with [facility]          Summary:   Tena Chaudhry is a 62 y.o. female with progressive metastatic gynecological/endometrial cancer first biopsied FEB21, sp surgeries and now on 3rd line treatment with [facility] Oncology.  Secondary history is note for DM2, DM2, HLD, morbid obesity>BMI40, etc. Patient was transferred here from a local hospital in [place], [place] , due to worsening encephalopathy and poor oral intake x8 days. Patient has so far been treated for sepsis and oligouric  "KAREN.  ---------------------------------------------------------------------------------------------------------------------------------------------  Met patient and her 2 sons bedside and introduced myself and scope of practice as PCMD.  They were amenable to continue encounter.  Family are generally encouraged so far, as patient seems much more lucid today and her infections appears to be getting addressed.    Patient is aware of her own disease progression. She admits prior to admission she had been only able to \"walk 30 feet inside the house.\" This has been the baseline for months now.  She has also had much reduced appetite for month, contributing to a 30+ pound weight loss and low ALB<2.5 last 6 months.  Patient has also been off chemo for weeks at least now due to a prior C diff episode.    We discussed disease trajectory. I was direct and relayed my concern that  patient may be approaching her clinical outcome, and is going to stay in her current state and unable to tolerate much more cancer treatment now if at all to significantly improve her prognosis, which I informed patient and family could well be in just few months.    We also discussed the many things that can affect patient's PO intake: infection, KAREN, cancer or other meds, and even cancer itself (cancer cachexia syndrome). Patient did lose 30+ pound last 6 months, and her 2 sons did not appear surprised by this noticeable weight loss.  They noticed too.    Patient appeared to understand and follow my explanation well, and acknowledged my assessment.  She clearly expressed her hope to continue exploring more treatment options with [facility] to extend life, and that she is not quite ready to just think about comfort yet.    We discuss code status and the lack of benefits in resuscitation.  Patient stated indeed she has had discussions with her sons, and DNAR DNI was most in alignment with her at this time.  \"We have talked about it,\" she said.  With " patient's own admission, I proceeded to update code status.             Advance care planning:  The patient and/or legal decision maker has provided voluntary consent to discuss advance care planning. We discussed code status.  DNAR DNI.    Total time spent in ACP discussion 15 minutes, which is separate from the time spent completing the evaluation and management visit.     Thank you for allowing me the opportunity to participate in the care of Tena Chaudhry    I spent a total of 60 minutes reviewing medical records, direct face-to-face time with the patient and/or family, documentation and coordination of care. This is separate from the time spent on advance care planning, which is documented above.         DO Ismael MTZ (TIM)[facility]   [address] [place]  [id number]  P: [phone]  F: [phone]  C: [phone]

## 2023-07-07 NOTE — CARE PLAN
The patient is Stable - Low risk of patient condition declining or worsening    Shift Goals  Clinical Goals: Monitor skin for impairment.maintain isolation precautions  Patient Goals: Rest and comfort  Family Goals: JANEL    Progress made toward(s) clinical / shift goals: on going     Patient is  progressing towards the following goals:  Problem: Fall Risk  Goal: Patient will remain free from falls  Outcome: Progressing     Problem: Pain - Standard  Goal: Alleviation of pain or a reduction in pain to the patient’s comfort goal  Outcome: Progressing     Problem: Skin Integrity  Goal: Skin integrity is maintained or improved  Outcome: Progressing     Problem: Knowledge Deficit - Standard  Goal: Patient and family/care givers will demonstrate understanding of plan of care, disease process/condition, diagnostic tests and medications  Outcome: Progressing

## 2023-07-08 NOTE — PROGRESS NOTES
"NEPHROLOGY PROGRESS NOTE        HPI:  Tena Chaudhry is a 62 y.o. female admitted 7/5/2023 metastatic endometrial carcinoma with new metastatic disease status post resection and chemotherapy presents to the emergency room with acute kidney injury.    7/6Sujbective - no overnight events.     7/7: no overnight events     7/8: Evaluated by oncology without plans for additional curative treatment at this time. Continued coordination for possible transfer to [facility]      ROS    /47   Pulse 94   Temp 36.1 °C (96.9 °F) (Temporal)   Resp 18   Ht 1.6 m (5' 3\")   Wt 104 kg (228 lb 13.4 oz)   SpO2 96%   BMI 40.54 kg/m²     Physical Exam  GEN: alert and oriented. In no acute distress.   HEENT: moist oropharyngeal mucous membranes  CV:RRR  PULM: clear to auscultation bilaterally  ABD: soft non tender non distended  EXT: warm well perfused, no lower extremity edema.    Labs reviewed.  Recent Labs     07/06/23 0738 07/07/23 0410 07/08/23 0315   ALBUMIN 2.3* 2.1* 2.1*   SODIUM 123* 126* 123*   POTASSIUM 4.1 4.8 5.3   CHLORIDE 89* 94* 91*   CO2 14* 13* 12*   BUN 85* 88* 92*   CREATININE 5.30* 5.09* 6.19*   PHOSPHORUS 4.1 4.7*  --          Lab Results   Component Value Date/Time    WBC 16.6 (H) 07/08/2023 03:15 AM    RBC 2.71 (L) 07/08/2023 03:15 AM    HEMOGLOBIN 7.2 (L) 07/08/2023 03:15 AM    HEMATOCRIT 23.7 (L) 07/08/2023 03:15 AM    MCV 87.5 07/08/2023 03:15 AM    MCH 26.6 (L) 07/08/2023 03:15 AM    MCHC 30.4 (L) 07/08/2023 03:15 AM    MPV 12.2 07/08/2023 03:15 AM      Recent Labs     07/06/23  0738 07/07/23 0410 07/08/23 0315   WBC 14.2* 16.6* 16.6*   RBC 2.57* 2.82* 2.71*   HEMOGLOBIN 6.9* 7.6* 7.2*   HEMATOCRIT 22.9* 24.3* 23.7*   MCV 89.1 86.2 87.5   MCH 26.8* 27.0 26.6*   MCHC 30.1* 31.3* 30.4*   RDW 66.9* 64.4* 66.6*   PLATELETCT 76* 85* 71*   MPV 10.9 11.1 12.2       Recent Labs     07/06/23  0738 07/07/23  0410 07/08/23  0315   SODIUM 123* 126* 123*   POTASSIUM 4.1 4.8 5.3   CHLORIDE 89* 94* 91*   CO2 " 14* 13* 12*   GLUCOSE 139* 87 81   BUN 85* 88* 92*   CREATININE 5.30* 5.09* 6.19*   CALCIUM 9.5 9.2 8.8       URINALYSIS:  No results found for: COLORURINE, CLARITY, SPECGRAVITY, PHURINE, KETONES, PROTEINURIN, BILIRUBINUR, UROBILU, NITRITE, LEUKESTERAS, OCCULTBLOOD  UPC  No results found for: TOTPROTUR No results found for: CREATININEU    Imaging report(s) reviewed  OUTSIDE IMAGES-CT HEAD   Final Result      CT-ABDOMEN-PELVIS W/O   Final Result      1.  Large complex right pelvic mass measuring at least 11.8 x 11.8 x 16.9 cm which could be ovarian malignancy however suboptimally evaluated without contrast.   2.  Abdominal and pelvic lymphadenopathy in keeping with chel metastases.   3.  Peritoneal metastases and trace ascites.   4.  Left posterolateral abdominal wall with continuous metastasis.   5.  10 mm lingular pulmonary nodule could be metastatic.   6.  Trace right pleural effusion.   7.  Mild right hydroureteronephrosis likely related to partial obstruction of the right ureter from the right pelvic mass   8.  Hepatosplenomegaly and probable hepatic steatosis.               Assessment:  Tena Chaudhry is a 62 y.o. female admitted 7/5/2023 metastatic endometrial carcinoma with new metastatic disease status post resection and chemotherapy presents to the emergency room with acute kidney injury.     Oliguric Acute Kidney Injury . Patient with component of obstructive causes in the setting of metastastic disease.  While the volume of the hydronephrosis is mild, the effect on kidney function may still be significant given ongoing decline in renal function.      Unfortunately patient remains a poor candidate for longterm dialysis in the setting f metastatic disease. Continue to observe.  No uremic symptoms.      -Reviewed imaging of abdomen/pelvis given metastatic malignancy, consistent with obstructive uropathy.  - Discuss further with primary, oncology consideration of  ureteral stent placement at least for  pallative reasons, and declining renal function.   - Maintain MAP>65   - Continue broad spectrum antibiotics.   - Dose all medications for patient level of renal function  - Avoid nephrotoxic agents including contrast and NSAIDs  - Encourage adequate hydration.  - Continue to monitor renal function.  Obtain BMP, urine studies including urine protein quantification.  -No immediate need for hemodialysis however will continue to assess on a daily basis  - Appreciate palliative care recommendations. Poor candidate for longterm dialysis   given malignancy. Discussed with patient and family extensively on 7/7/23  -Recommend continued goals of care conversations to guide treatment.      2. DM - Lantus 32 units qHS. Conitnue insulin sliding scale.     3. Anemia - Transfuse packed red blood cells to maintain hemoglobin greater than 7.     4. Anion Gap metabolic Acidosis -treatment of acute kidney injury as above.  Increased rate of infusion sodium bicarbonate. (Ordered)   Gentle hydration.  Continue to monitor.     5. Hyponatremia - improving. encourage po intake. Avoid salt tablets. Continue to monitor.          Meena Altamirano MD  Nephrology  [facility]

## 2023-07-08 NOTE — PROGRESS NOTES
Hospital Medicine Daily Progress Note    Date of Service  7/8/2023    Chief Complaint  Tena Chaudhry is a 62 y.o. female admitted 7/5/2023 with weakness, encephalopathy, UTI from an outlying facility    Hospital Course  Tena Chaudhry is a 62 y.o. female past medical history diabetes type 2, hyperlipidemia, hypothyroidism, chronic kidney disease, and advanced metastatic endometrial carcinoma previously treated [facility] gyn/onc and infusions, now on oral chemotherapy, who presented 7/5/2023 with acute encephalopathy, acute renal failure and urosepsis transferred from outside hospital [place].  She had complaint of 8 days worsening confusion lack of appetite poor p.o. intake family noticed she has had difference in behavior including confusion, she felt fatigued with low energy, as well as complaint of dysuria and frequency including uncomfortable urination.  [facility] emergency department vitals found to be within normal limits other than borderline tachycardia , laboratories significant for hyponatremia as well as acute renal failure in the setting of CKD with creatinine up to 5.1 from 3.5, BNP normal at 61 with a negative troponin, leukocytosis 13.4, UA with positive nitrates positive leuk esterase  white blood cells per high-powered field and hyaline casts at 30-50.  She had a anion gap metabolic acidosis.  Patient was started on crystalloid resuscitation received 2 L while she was in the emergency department there.  Given her history of ESBL urinary tract infection she was started empirically on ertapenem and vancomycin.  Transferred for higher level of care, nephrology evaluation    Interval Problem Update  No acute events overnight.  Hgb 7.2. Monitor closely, transfuse for Hgb<7.  Continue ceftriaxone for UTI, meropenem stopped yesterday as cultures from [facility] are NGTD.  Cr slightly worse today at 6.19. Nephrology following.  Gyn oncology recommends no surgical intervention at this  time, can consider nephrostomy tube/ureter stent placement if kidney function worsens.  Na 123, slightly worse today. Start NS 150cc/hr.  Plan of care discussed with nephrology. Monitor for signs of uremia, need for urgent dialysis.  Spoke with Dr Fidelia Mcginnis, on call oncologist for patient's oncologist Dr Kristina Terrell at [facility]. She recommends transfer to [facility] or [facility] for oncology care. Transfer center notified, await transfer when available.  Patient has high medical complexity, complex decision making and is at high risk for complication, morbidity, and mortality.      I have discussed this patient's plan of care and discharge plan at IDT rounds today with Case Management, Nursing, Nursing leadership, and other members of the IDT team.    Consultants/Specialty  critical care and nephrology  Gynecology-surgical oncology    Code Status  DNAR/DNI    Disposition  Medically Cleared  I have placed the appropriate orders for post-discharge needs.    Review of Systems  Review of Systems   Constitutional:  Positive for malaise/fatigue. Negative for chills and fever.   HENT: Negative.     Eyes: Negative.    Respiratory: Negative.  Negative for cough.    Cardiovascular: Negative.  Negative for chest pain and palpitations.   Gastrointestinal: Negative.  Negative for heartburn, nausea and vomiting.   Genitourinary: Negative.  Negative for dysuria and frequency.   Musculoskeletal:  Positive for myalgias. Negative for back pain and neck pain.   Skin: Negative.  Negative for itching and rash.   Neurological: Negative.  Negative for dizziness, focal weakness, weakness and headaches.   Endo/Heme/Allergies: Negative.  Negative for polydipsia. Does not bruise/bleed easily.   Psychiatric/Behavioral:  Negative for depression. The patient is nervous/anxious.         Physical Exam  Temp:  [36.1 °C (96.9 °F)-36.9 °C (98.5 °F)] 36.1 °C (96.9 °F)  Pulse:  [] 94  Resp:  [18-19] 18  BP: ()/(46-50) 101/47  SpO2:   [93 %-96 %] 96 %    Physical Exam  Vitals and nursing note reviewed.   Constitutional:       Appearance: She is well-developed. She is ill-appearing. She is not diaphoretic.   HENT:      Head: Normocephalic and atraumatic.      Nose: Nose normal.   Eyes:      Conjunctiva/sclera: Conjunctivae normal.      Pupils: Pupils are equal, round, and reactive to light.   Neck:      Thyroid: No thyromegaly.      Vascular: No JVD.   Cardiovascular:      Rate and Rhythm: Normal rate and regular rhythm.      Heart sounds: Normal heart sounds.      No friction rub. No gallop.   Pulmonary:      Effort: Pulmonary effort is normal.      Breath sounds: Normal breath sounds. No wheezing or rales.   Abdominal:      General: Bowel sounds are normal. There is no distension.      Palpations: Abdomen is soft. There is no mass.      Tenderness: There is no abdominal tenderness. There is no guarding or rebound.   Genitourinary:     Comments: Dark urine output, Enriquez  Musculoskeletal:         General: Swelling present. No tenderness. Normal range of motion.      Cervical back: Normal range of motion and neck supple.   Lymphadenopathy:      Cervical: No cervical adenopathy.   Skin:     General: Skin is warm and dry.      Coloration: Skin is pale.   Neurological:      Mental Status: She is alert and oriented to person, place, and time.      Cranial Nerves: No cranial nerve deficit.      Motor: Weakness present.   Psychiatric:         Behavior: Behavior normal.         Fluids    Intake/Output Summary (Last 24 hours) at 7/8/2023 1411  Last data filed at 7/8/2023 1242  Gross per 24 hour   Intake 1698.74 ml   Output 300 ml   Net 1398.74 ml       Laboratory  Recent Labs     07/06/23  0738 07/07/23  0410 07/08/23  0315   WBC 14.2* 16.6* 16.6*   RBC 2.57* 2.82* 2.71*   HEMOGLOBIN 6.9* 7.6* 7.2*   HEMATOCRIT 22.9* 24.3* 23.7*   MCV 89.1 86.2 87.5   MCH 26.8* 27.0 26.6*   MCHC 30.1* 31.3* 30.4*   RDW 66.9* 64.4* 66.6*   PLATELETCT 76* 85* 71*   MPV 10.9  11.1 12.2     Recent Labs     07/06/23  0738 07/07/23  0410 07/08/23  0315   SODIUM 123* 126* 123*   POTASSIUM 4.1 4.8 5.3   CHLORIDE 89* 94* 91*   CO2 14* 13* 12*   GLUCOSE 139* 87 81   BUN 85* 88* 92*   CREATININE 5.30* 5.09* 6.19*   CALCIUM 9.5 9.2 8.8                   Imaging  OUTSIDE IMAGES-CT HEAD   Final Result      CT-ABDOMEN-PELVIS W/O   Final Result      1.  Large complex right pelvic mass measuring at least 11.8 x 11.8 x 16.9 cm which could be ovarian malignancy however suboptimally evaluated without contrast.   2.  Abdominal and pelvic lymphadenopathy in keeping with chel metastases.   3.  Peritoneal metastases and trace ascites.   4.  Left posterolateral abdominal wall with continuous metastasis.   5.  10 mm lingular pulmonary nodule could be metastatic.   6.  Trace right pleural effusion.   7.  Mild right hydroureteronephrosis likely related to partial obstruction of the right ureter from the right pelvic mass   8.  Hepatosplenomegaly and probable hepatic steatosis.              Assessment/Plan  * Septic shock (HCC)- (present on admission)  Assessment & Plan  S/p treatment, ongoing treatment for UTI  Awaiting culture results  History of ESBL  History of C. difficile, currently negative  Deescalate meropenem to ceftriaxone for now    Advanced care planning/counseling discussion  Assessment & Plan  Spent over 25 minutes of face to face time with patient and patient's son Kiran on phone discussing current diagnostics, likely poor prognosis given metastatic disease, goals of care discussion. Patient and son want to continue with treatment at this time.    Anemia requiring transfusions  Assessment & Plan  No current overt blood loss noted  Transfuse as indicated  Monitor    ESRD (end stage renal disease) (HCC)- (present on admission)  Assessment & Plan  Nephrology consulting    Hyponatremia- (present on admission)  Assessment & Plan  Multifactorial with free water overload, renal  failure    Encephalopathy acute- (present on admission)  Assessment & Plan  Multifactorial, underlying malignancy, renal failure,  Overall nonfocal    Acute renal failure (HCC)- (present on admission)  Assessment & Plan  On prior CKD  Possibly dehydration with concurrent infection  Versus progressive tumor burden with obstruction  Nephrology consulting  Ongoing fluid support  Avoid nephrotoxins  Gyn oncology consulted, do not recommend surgical intervention at this time  Cr slightly worse today at 6.19; currently no urgent indication for dialysis, continue to monitor  Nephrology following    High anion gap metabolic acidosis- (present on admission)  Assessment & Plan  Improving with supportive care    Endometrial carcinoma (HCC)- (present on admission)  Assessment & Plan  Advanced, follow-up CT noted  Abdominal carcinomatosis, metastatic disease  Patient generally followed at [facility]  We will involve palliative care, given the patient's progressive decline  Family struggles with remote location and limited assistance  Gyn oncology consulted, do not recommend surgical intervention for possible ureter obstruction; if kidney function worsens can consider stent placement vs nephrostomy tube  Spoke with Dr Mcginnis, partner of patient's oncologist Dr Terrell at [facility], she requests transfer of patient to [facility] or [facility] for oncology care; transfer center notified, pending transfer    Hypothyroidism- (present on admission)  Assessment & Plan  Continue replacement, recheck TFTs    HLD (hyperlipidemia)- (present on admission)  Assessment & Plan  History of, currently not a candidate for treatment    DM (diabetes mellitus) (HCC)- (present on admission)  Assessment & Plan  Ongoing glycemic control, monitoring             VTE prophylaxis: SCDs/TEDs

## 2023-07-08 NOTE — CARE PLAN
The patient is Watcher - Medium risk of patient condition declining or worsening    Shift Goals  Clinical Goals: maintain skin integrity; safety; pain management, monitor blood sugars  Patient Goals: pain management, comfort  Family Goals: GOC discussion with palliative; improve oral intake    Progress made toward(s) clinical / shift goals:    Problem: Knowledge Deficit - Standard  Goal: Patient and family/care givers will demonstrate understanding of plan of care, disease process/condition, diagnostic tests and medications  Outcome: Progressing     Problem: Skin Integrity  Goal: Skin integrity is maintained or improved  Outcome: Progressing     Problem: Pain - Standard  Goal: Alleviation of pain or a reduction in pain to the patient’s comfort goal  Outcome: Progressing     Problem: Fall Risk  Goal: Patient will remain free from falls  Outcome: Progressing       Patient drowsy but more alert today than yesterday. Patient moved to low air loss mattress and TAPS in place. Q2 turns throughout the day. Enriquez catheter in place; melissa care performed. Incontinent of bowel throughout the day. Stool sample sent this AM. Barrier cream to reddened sacrum. Patient still with very poor PO intake so nutrition consult placed. Palliative care consulted and visited patient today with family at bedside; code status changed. Patient with increasing weakness; not able to help with turns in bed. Poor PO intake; nutrition consult placed. Family at bedside throughout the day. No current complaints/concerns.

## 2023-07-08 NOTE — ASSESSMENT & PLAN NOTE
Spent over 30 minutes of face to face time spent with patient and son today discussing current clinical status, treatment options including nephrostomy tube, future dialysis, comfort care and hospice. Discussed my recommendations for hospice and comfort focused care. Patient and son would like to pursue all active medical treatment with hopes of chemotherapy in the future. Await transfer to [facility] in [place], [place] for oncology care.

## 2023-07-08 NOTE — CARE PLAN
The patient is Watcher - Medium risk of patient condition declining or worsening    Shift Goals  Clinical Goals: safety; maintain skin integrity; pain management  Patient Goals: pain control  Family Goals: pain management    Progress made toward(s) clinical / shift goals:    Problem: Pain - Standard  Goal: Alleviation of pain or a reduction in pain to the patient’s comfort goal  Outcome: Progressing     Problem: Fall Risk  Goal: Patient will remain free from falls  Outcome: Progressing     Patient alert but withdrawn. Poor PO intake; encouraged intake and offered sips of nutrition shakes from son. Not eating her meal trays. Nutrition consult in place. Blood sugars remain in the 80's throughout the day, no need for insulin or hypoglycemic treatment. Occasionally refused repositioning d/t pain and discomfort. Multiple loose bowel movements throughout the day. Covid swab taken and sent to lab. Patient not wishing to participate much in cares today. Family at bedside, very supportive and helpful to patient. Bed locked in lowest position and call bell within reach.    Patient is not progressing towards the following goals:

## 2023-07-08 NOTE — CONSULTS
Gynecologic Oncology Consultation    Date: 7/7/2023    Requesting Physician: Dr. Eb Dominguez     Consulting Physician: Jasmin Wood P.A.-C. /Dr. Jc Acosta     Reason for consultation: Hx of endometrial cancer      HPI: This is a 62 y.o. female with past medical hx of type 2 diabetes, HLD, hypothyroidism, CKD and recurrent endometrial cancer who was transferred to [facility] from outside hospital for urosepsis.  She was in her usual state of health until her son and family noticed she was more fatigued, confusion, and lack of appetite.  On admission to [facility], she was found to have an elevated Cr, leukocytosis, and a CT scan revealed large right pelvic mass measuring 11 x 11 x 16, abdominal and pelvic lymphadenopathy, peritoneal metastases, abdominal wall metastases, mild right hydroureteronephrosis, and hepatosplenomegaly. Nephrology was consulted. Due to her history of endometrial cancer, we were asked to consult.     She was seen at bedside with her son. She endorses the above history. Per her son, they brought her to the ED in [place] due her lack of energy and appetite and not acting herself.  She has previously been on oral chemotherapy, Letrozole and Everolimus until 5/31/2023 when it was held for persistent diarrhea.  Per patient she is feeling ok, she is somnolent on exam and answering questions minimally.  She states she has minimal pain, worse with movement. She denies nausea or vomiting. She continues to have diarrhea. She denies vaginal bleeding.       Review of Systems   Constitutional:  Positive for malaise/fatigue. Negative for chills and fever.   HENT:  Negative for sinus pain and sore throat.    Eyes:  Negative for blurred vision.   Respiratory:  Negative for cough and shortness of breath.    Cardiovascular:  Negative for chest pain.   Gastrointestinal:  Positive for diarrhea. Negative for abdominal pain, nausea and vomiting.   Genitourinary:  Negative for dysuria, frequency and urgency.    Musculoskeletal:  Negative for back pain.   Neurological:  Negative for dizziness.   Endo/Heme/Allergies:  Does not bruise/bleed easily.        Past medical history of CKD, Type 2 diabetes, HLD, hypothyroidism, endometrial cancer     Past surgical history significant for surgical resection of endometrial cancer in 2021      Current Facility-Administered Medications   Medication Dose Route Frequency Provider Last Rate Last Admin    dronabinol (Marinol) capsule 5 mg  5 mg Oral BEFORE LUNCH AND DINNER Eb Dominguez M.D.   5 mg at 07/07/23 1159    sodium bicarbonate 75 mEq in 1/2 NS 1,000 mL infusion  75 mEq Intravenous Continuous Meena Altamirano M.D. 42 mL/hr at 07/07/23 1503 75 mEq at 07/07/23 1503    cefTRIAXone (Rocephin) syringe 2,000 mg  2,000 mg Intravenous Q EVENING Eb Dominguez M.D.        levothyroxine (Synthroid) tablet 175 mcg  175 mcg Oral AM ES Speedy Varela M.D.   175 mcg at 07/07/23 0546    oxyCODONE immediate-release (Roxicodone) tablet 2.5 mg  2.5 mg Oral Q6HRS PRN Speedy Varela M.D.   2.5 mg at 07/07/23 1159    Or    oxyCODONE immediate-release (Roxicodone) tablet 5 mg  5 mg Oral Q6HRS PRN Speedy Varela M.D.        acetaminophen (Tylenol) tablet 650 mg  650 mg Oral Q6HRS PRN John Milligan M.D.   650 mg at 07/05/23 1430    labetalol (Normodyne/Trandate) injection 10 mg  10 mg Intravenous Q4HRS PRN John Milligan M.D.        insulin GLARGINE (Lantus,Semglee) injection  32 Units Subcutaneous Q EVENING John Milligan M.D.   32 Units at 07/05/23 1724    dextrose 10 % BOLUS 25 g  25 g Intravenous Q15 MIN PRN John Milligan M.D.        insulin lispro (HumaLOG,AdmeLOG) injection  2-9 Units Subcutaneous TID JERMAINE Varela M.D.           Allergies:  Patient has no known allergies.    Social History     Socioeconomic History    Marital status: Single     Spouse name: Not on file    Number of children: Not on file    Years of education: Not on file    Highest education  "level: Not on file   Occupational History    Not on file   Tobacco Use    Smoking status: Not on file    Smokeless tobacco: Not on file   Substance and Sexual Activity    Alcohol use: Not on file    Drug use: Not on file    Sexual activity: Not on file   Other Topics Concern    Not on file   Social History Narrative    Not on file     Social Determinants of Health     Financial Resource Strain: Not on file   Food Insecurity: Not on file   Transportation Needs: Not on file   Physical Activity: Not on file   Stress: Not on file   Social Connections: Not on file   Intimate Partner Violence: Not on file   Housing Stability: Not on file       No family history on file.      Physical Exam:  BP 91/50   Pulse (!) 103   Temp 36.9 °C (98.5 °F) (Temporal)   Resp 19   Ht 1.6 m (5' 3\")   Wt 104 kg (228 lb 13.4 oz)   SpO2 93%       Physical Exam  Constitutional:       General: She is not in acute distress.     Appearance: She is obese. She is ill-appearing.   HENT:      Head: Normocephalic.      Mouth/Throat:      Mouth: Mucous membranes are moist.   Cardiovascular:      Rate and Rhythm: Normal rate and regular rhythm.      Pulses: Normal pulses.   Pulmonary:      Effort: Pulmonary effort is normal.      Breath sounds: Normal breath sounds.   Abdominal:      General: There is no distension.      Palpations: Abdomen is soft. There is no mass.      Tenderness: There is no abdominal tenderness.   Genitourinary:     Comments: Voiding to ortiz   Musculoskeletal:         General: Normal range of motion.   Skin:     General: Skin is warm and dry.   Neurological:      Mental Status: She is alert and oriented to person, place, and time.          Labs:  Recent Labs     07/05/23  0230 07/06/23  0738 07/07/23  0410   WBC 12.0* 14.2* 16.6*   RBC 2.21* 2.57* 2.82*   HEMOGLOBIN 5.9* 6.9* 7.6*   HEMATOCRIT 19.6* 22.9* 24.3*   MCV 88.7 89.1 86.2   MCH 26.7* 26.8* 27.0   MCHC 30.1* 30.1* 31.3*   RDW 68.6* 66.9* 64.4*   PLATELETCT 90* 76* 85* "   MPV 10.6 10.9 11.1     Recent Labs     07/05/23  0230 07/06/23  0738 07/07/23  0410   SODIUM 119* 123* 126*   POTASSIUM 4.6 4.1 4.8   CHLORIDE 86* 89* 94*   CO2 11* 14* 13*   GLUCOSE 185* 139* 87   BUN 85* 85* 88*   CREATININE 5.07* 5.30* 5.09*   CALCIUM 9.6 9.5 9.2         Recent Labs     07/05/23  0230 07/06/23  0738 07/07/23  0410   ASTSGOT 147* 144* 217*   ALTSGPT 49 52* 53*   TBILIRUBIN 1.9* 1.9* 2.0*   ALKPHOSPHAT 656* 705* 841*   GLOBULIN 4.0* 4.4* 4.1*       Radiology:  CT abd/pelvis:     IMPRESSION:     1.  Large complex right pelvic mass measuring at least 11.8 x 11.8 x 16.9 cm which could be ovarian malignancy however suboptimally evaluated without contrast.  2.  Abdominal and pelvic lymphadenopathy in keeping with chel metastases.  3.  Peritoneal metastases and trace ascites.  4.  Left posterolateral abdominal wall with continuous metastasis.  5.  10 mm lingular pulmonary nodule could be metastatic.  6.  Trace right pleural effusion.  7.  Mild right hydroureteronephrosis likely related to partial obstruction of the right ureter from the right pelvic mass  8.  Hepatosplenomegaly and probable hepatic steatosis.      Assessment: This is a 62 y.o. female with pmhx of T2DM, HLD, hypothyroidism, CKD and recurrent endometrial cancer, admitted for urosepsis and KAREN.     Plan:     Endometrial cancer: per chart review, patient was diagnosed with stage III in 2021, she has an extensive treatment history with [facility] including neoadjuvant chemotherapy, surgical resection, adjuvant chemotherapy, salvage chemotherapy and radiation to her vaginal cuff. She was most recently on Letrozole and Everolimus, which was discontinued at end of May.  CT scan this admission shows metastatic disease, unclear if this has worsened since last imaging with oncologist, per records review patient has known metastatic disease.  She had recent visit on 7/5 and per son will follow up in one week, on 7/11, via telehealth.   Case and imaging  reviewed with Dr. Acosta, no acute intervention at this indicated at this time, she needs continued treatment for acute KAREN and urosepsis.   Would recommend patient follow up as scheduled with her Oncologist for continued treatment planning.   Will have records faxed from this admission to [facility].     KAREN: acute on chronic, Nephrology following, reviewed imaging with Dr. Acosta - hydronephrosis is mild on imaging - defer to Nephrology recommendations, no compelling indication for R NT at this time, would consider NT if Cr doesn't continue to improved.    Case and plan discussed with Dr. Acosta     Thank you for for allowing us to participate in this patient's care.Please feel free to contact us for any questions or if we can be of any further assistance.      Jasmin Wood PA-C  Gynecologic Oncology  [facility]

## 2023-07-08 NOTE — THERAPY
Physical Therapy Contact Note    Patient Name: Tena Chaudhry  Age:  62 y.o., Sex:  female  Medical Record #: [id number]  Today's Date: 7/8/2023    PT consult received, per chart review planned for transfer to another hospital; will defer acute PT evaluation unless plan of care changes;     Judith HUBER, PT,  [phone]

## 2023-07-08 NOTE — CARE PLAN
The patient is Stable - Low risk of patient condition declining or worsening    Shift Goals  Clinical Goals: Maintain skin intergrity, pain management below <4  Patient Goals: Rest and comfort  Family Goals: JANEL    Progress made toward(s) clinical / shift goals:  on going    Patient is not progressing towards the following goals:  Problem: Pain - Standard  Goal: Alleviation of pain or a reduction in pain to the patient’s comfort goal  Outcome: Progressing     Problem: Fall Risk  Goal: Patient will remain free from falls  Outcome: Progressing     Problem: Skin Integrity  Goal: Skin integrity is maintained or improved  Outcome: Progressing     Problem: Knowledge Deficit - Standard  Goal: Patient and family/care givers will demonstrate understanding of plan of care, disease process/condition, diagnostic tests and medications  Outcome: Progressing

## 2023-07-08 NOTE — PROGRESS NOTES
Hospital Medicine Daily Progress Note    Date of Service  7/7/2023    Chief Complaint  Tena Chaudhry is a 62 y.o. female admitted 7/5/2023 with weakness, encephalopathy, UTI from an outlying facility    Hospital Course  Tena Chaudhry is a 62 y.o. female past medical history diabetes type 2, hyperlipidemia, hypothyroidism, chronic kidney disease, and advanced metastatic endometrial carcinoma previously treated [facility] gyn/onc and infusions, now on oral chemotherapy, who presented 7/5/2023 with acute encephalopathy, acute renal failure and urosepsis transferred from outside hospital [place].  She had complaint of 8 days worsening confusion lack of appetite poor p.o. intake family noticed she has had difference in behavior including confusion, she felt fatigued with low energy, as well as complaint of dysuria and frequency including uncomfortable urination.  [facility] emergency department vitals found to be within normal limits other than borderline tachycardia , laboratories significant for hyponatremia as well as acute renal failure in the setting of CKD with creatinine up to 5.1 from 3.5, BNP normal at 61 with a negative troponin, leukocytosis 13.4, UA with positive nitrates positive leuk esterase  white blood cells per high-powered field and hyaline casts at 30-50.  She had a anion gap metabolic acidosis.  Patient was started on crystalloid resuscitation received 2 L while she was in the emergency department there.  Given her history of ESBL urinary tract infection she was started empirically on ertapenem and vancomycin.  Transferred for higher level of care, nephrology evaluation    Interval Problem Update  No acute events overnight.  Hgb 7.6, improved after RBC transfusion yesterday.  Leukocytosis worse 16k. Cr stable 5.  Discussed CT scan findings of large pelvic mass, numerous abdominal metastasis, possible lung metastasis.  Urine and blood cultures from OSH with no growth to date per  pharmacy.  Hx of ESBL UTI. Can deescalate meropenem to ceftriaxone for now.  Gyn oncology consulted given pelvic mass with obstruction of right kidney causing hydronephrosis, likely contributing to acute on chronic kidney injury. Appreciate their recommendations.  Palliative care following.  Discussed my worry for patient regarding metastatic cancer diagnosis, likely uncurable; as well as her poor functional status limiting her tolerance for cancer treatment, complication of acute renal failure. Discussed plan of care with patient and patient's son on phone at bedside.   Patient is has a high medical complexity, complex decision making and is at high risk for complication, morbidity, and mortality.    I have discussed this patient's plan of care and discharge plan at IDT rounds today with Case Management, Nursing, Nursing leadership, and other members of the IDT team.    Consultants/Specialty  critical care and nephrology    Code Status  DNAR/DNI    Disposition  The patient is not medically cleared for discharge to home or a post-acute facility.      I have placed the appropriate orders for post-discharge needs.    Review of Systems  Review of Systems   Constitutional:  Positive for malaise/fatigue. Negative for chills and fever.   HENT: Negative.     Eyes: Negative.    Respiratory: Negative.  Negative for cough.    Cardiovascular: Negative.  Negative for chest pain and palpitations.   Gastrointestinal: Negative.  Negative for heartburn, nausea and vomiting.   Genitourinary: Negative.  Negative for dysuria and frequency.   Musculoskeletal:  Positive for myalgias. Negative for back pain and neck pain.   Skin: Negative.  Negative for itching and rash.   Neurological: Negative.  Negative for dizziness, focal weakness, weakness and headaches.   Endo/Heme/Allergies: Negative.  Negative for polydipsia. Does not bruise/bleed easily.   Psychiatric/Behavioral:  Negative for depression. The patient is nervous/anxious.          Physical Exam  Temp:  [36.8 °C (98.2 °F)-36.9 °C (98.5 °F)] 36.9 °C (98.5 °F)  Pulse:  [100-108] 103  Resp:  [16-19] 19  BP: (90-98)/(44-54) 91/50  SpO2:  [93 %-99 %] 93 %    Physical Exam  Vitals and nursing note reviewed.   Constitutional:       Appearance: She is well-developed. She is ill-appearing. She is not diaphoretic.   HENT:      Head: Normocephalic and atraumatic.      Nose: Nose normal.   Eyes:      Conjunctiva/sclera: Conjunctivae normal.      Pupils: Pupils are equal, round, and reactive to light.   Neck:      Thyroid: No thyromegaly.      Vascular: No JVD.   Cardiovascular:      Rate and Rhythm: Normal rate and regular rhythm.      Heart sounds: Normal heart sounds.      No friction rub. No gallop.   Pulmonary:      Effort: Pulmonary effort is normal.      Breath sounds: Normal breath sounds. No wheezing or rales.   Abdominal:      General: Bowel sounds are normal. There is no distension.      Palpations: Abdomen is soft. There is no mass.      Tenderness: There is no abdominal tenderness. There is no guarding or rebound.   Genitourinary:     Comments: Dark urine output, Enriquez  Musculoskeletal:         General: Swelling present. No tenderness. Normal range of motion.      Cervical back: Normal range of motion and neck supple.   Lymphadenopathy:      Cervical: No cervical adenopathy.   Skin:     General: Skin is warm and dry.      Coloration: Skin is pale.   Neurological:      Mental Status: She is alert and oriented to person, place, and time.      Cranial Nerves: No cranial nerve deficit.      Motor: Weakness present.   Psychiatric:         Behavior: Behavior normal.         Fluids    Intake/Output Summary (Last 24 hours) at 7/7/2023 1727  Last data filed at 7/7/2023 1000  Gross per 24 hour   Intake 120 ml   Output 450 ml   Net -330 ml       Laboratory  Recent Labs     07/05/23  0230 07/06/23  0738 07/07/23  0410   WBC 12.0* 14.2* 16.6*   RBC 2.21* 2.57* 2.82*   HEMOGLOBIN 5.9* 6.9* 7.6*    HEMATOCRIT 19.6* 22.9* 24.3*   MCV 88.7 89.1 86.2   MCH 26.7* 26.8* 27.0   MCHC 30.1* 30.1* 31.3*   RDW 68.6* 66.9* 64.4*   PLATELETCT 90* 76* 85*   MPV 10.6 10.9 11.1     Recent Labs     07/05/23  0230 07/06/23  0738 07/07/23  0410   SODIUM 119* 123* 126*   POTASSIUM 4.6 4.1 4.8   CHLORIDE 86* 89* 94*   CO2 11* 14* 13*   GLUCOSE 185* 139* 87   BUN 85* 85* 88*   CREATININE 5.07* 5.30* 5.09*   CALCIUM 9.6 9.5 9.2                   Imaging  OUTSIDE IMAGES-CT HEAD   Final Result      CT-ABDOMEN-PELVIS W/O   Final Result      1.  Large complex right pelvic mass measuring at least 11.8 x 11.8 x 16.9 cm which could be ovarian malignancy however suboptimally evaluated without contrast.   2.  Abdominal and pelvic lymphadenopathy in keeping with chel metastases.   3.  Peritoneal metastases and trace ascites.   4.  Left posterolateral abdominal wall with continuous metastasis.   5.  10 mm lingular pulmonary nodule could be metastatic.   6.  Trace right pleural effusion.   7.  Mild right hydroureteronephrosis likely related to partial obstruction of the right ureter from the right pelvic mass   8.  Hepatosplenomegaly and probable hepatic steatosis.              Assessment/Plan  * Septic shock (HCC)- (present on admission)  Assessment & Plan  S/p treatment, ongoing treatment for UTI  Awaiting culture results  History of ESBL  History of C. difficile, currently negative  Deescalate meropenem to ceftriaxone for now    Advanced care planning/counseling discussion  Assessment & Plan  Spent over 25 minutes of face to face time with patient and patient's son Kiran on phone discussing current diagnostics, likely poor prognosis given metastatic disease, goals of care discussion. Patient and son want to continue with treatment at this time.    Anemia requiring transfusions  Assessment & Plan  No current overt blood loss noted  Transfuse as indicated  Monitor    ESRD (end stage renal disease) (HCC)- (present on admission)  Assessment  & Plan  Nephrology consulting    Hyponatremia- (present on admission)  Assessment & Plan  Multifactorial with free water overload, renal failure    Encephalopathy acute- (present on admission)  Assessment & Plan  Multifactorial, underlying malignancy, renal failure,  Overall nonfocal    Acute renal failure (HCC)- (present on admission)  Assessment & Plan  On prior CKD  Possibly dehydration with concurrent infection  Versus progressive tumor burden with obstruction  Nephrology consulting  Ongoing fluid support  Avoid nephrotoxins  Gyn oncology consulted    High anion gap metabolic acidosis- (present on admission)  Assessment & Plan  Improving with supportive care    Endometrial carcinoma (HCC)- (present on admission)  Assessment & Plan  Advanced, follow-up CT noted  Abdominal carcinomatosis, metastatic disease  Patient generally followed at [facility]  We will involve palliative care, given the patient's progressive decline  Family struggles with remote location and limited assistance  Gyn oncology consulted    Hypothyroidism- (present on admission)  Assessment & Plan  Continue replacement, recheck TFTs    HLD (hyperlipidemia)- (present on admission)  Assessment & Plan  History of, currently not a candidate for treatment    DM (diabetes mellitus) (HCC)- (present on admission)  Assessment & Plan  Ongoing glycemic control, monitoring             VTE prophylaxis: SCDs/TEDs

## 2023-07-09 NOTE — DIETARY
"Nutrition services: Day 4 of admit.  Tena Chaudhry is a 62 y.o. female with admitting DX of metabolic acidosis and acute renal failure.     Consult received for poor oral intake. Pt with <25% of all recorded meals. RD met with pt at bedside. Pt reports low appetite and feeling bloated. Pt does drink the Boost supplements, requesting chocolate flavor. Pt preferences obtained. RD encouraged intake of all meals and supplements as able.       Assessment:  Height: 160 cm (5' 3\")  Weight: 104 kg (228 lb 13.4 oz) via bed scale   Body mass index is 40.54 kg/m²., BMI classification: obese   Diet/Intake: NPO, previously on Renal diet with <25% of meals.     Evaluation:   Current clinical picture and MD progress notes reviewed. Pt with metastatic endometrial carcinoma with new metastatic disease s/p resection and chemotherapy-previously treated by [facility]. Presented with KAREN. Eight days of poor PO and confusion PTA. Poor PO continues while in acute care. Plan to transfer back to [facility] for oncology care.   Labs (7/9) Na 124 (L), Ca 8.2 (L)  Meds: Marinol, synthroid, NS @ 75 mL/hr, oxycodone,   Skin: Generalized edema noted with generalized bilateral upper extremity edema and 2+ pitting bilateral lower extremity edema noted.   +BM 7/9    Malnutrition Risk: Pt at risk with >12 poor oral intake however does not meet criteria per ASPEN guidelines.     Recommendations/Plan:  Diet advancements past NPO as medically feasible per MD    Encourage intake of all meals and supplements once diet advanced  Document intake of all meals and supplements as % taken in ADL's to provide interdisciplinary communication across all shifts.   Monitor weight.  Nutrition rep will continue to see patient for ongoing meal and snack preferences.         RD following   "

## 2023-07-09 NOTE — PROGRESS NOTES
Hospital Medicine Daily Progress Note    Date of Service  7/9/2023    Chief Complaint  Tena Chaudhry is a 62 y.o. female admitted 7/5/2023 with weakness, encephalopathy, UTI from an outlying facility    Hospital Course  Tena Chaudhry is a 62 y.o. female past medical history diabetes type 2, hyperlipidemia, hypothyroidism, chronic kidney disease, and advanced metastatic endometrial carcinoma previously treated [facility] gyn/onc and infusions, now on oral chemotherapy, who presented 7/5/2023 with acute encephalopathy, acute renal failure and urosepsis transferred from outside hospital [place].  She had complaint of 8 days worsening confusion lack of appetite poor p.o. intake family noticed she has had difference in behavior including confusion, she felt fatigued with low energy, as well as complaint of dysuria and frequency including uncomfortable urination.  [facility] emergency department vitals found to be within normal limits other than borderline tachycardia , laboratories significant for hyponatremia as well as acute renal failure in the setting of CKD with creatinine up to 5.1 from 3.5, BNP normal at 61 with a negative troponin, leukocytosis 13.4, UA with positive nitrates positive leuk esterase  white blood cells per high-powered field and hyaline casts at 30-50.  She had a anion gap metabolic acidosis.  Patient was started on crystalloid resuscitation received 2 L while she was in the emergency department there.  Given her history of ESBL urinary tract infection she was started empirically on ertapenem and vancomycin.  Transferred for higher level of care, nephrology evaluation    Interval Problem Update  No acute events overnight.  Hgb stable 7.4.   Leukocytosis worse 18.6k.  BUN 93, Cr 6.45, kidney function worse today.  Discussed with patient and son at bedside, discussed poor long term prognosis given stage 4 endometrial cancer with multiple large metastasis including peritoneal  carcinomatosis. Spoke about patient's worsening renal failure and poor functional status, my recommendation for hospice and comfort focused care. Alternatively, can place nephrostomy tube in hopes of improving kidney function given pelvic mass obstruction of right kidney ureter. Discussed indications for hemodialysis which patient does not currently qualify for.   Patient initially agreeable to comfort focused measures and hospice. She stated she was tired and wanted it to end. Son tearful at bedside.  Came back later today to check on them, and son states they have decided to pursue full treatment course. They would like to pursue transfer to [place] and pursue chemotherapy in the future. They would like to continue treatment for acute on chronic renal failure with nephrostomy tube placement, and dialysis if needed in the future. Confirmed with patient that this is what she wanted, and she is agreeable to nephrostomy tube and future chemotherapy.  Patient made NPO, IR consult placed for right nephrostomy tube vs ureter stent.  Continue IV fluids and antibiotics for UTI.  Discussed with transfer center, patient has been accepted to [facility] in [place], [place]. Discussed with Dr Mcginnis oncology at [facility], they have a bed available, await insurance authorization for transfer. Transfer center notified.  Case discussed with gyn oncology and nephrology here.    Patient has high medical complexity, complex decision making and is at high risk for complication, morbidity, and mortality.  Over 60 minutes spent on patient care today, over half of which was spent on face to face discussion with patient and son.      I have discussed this patient's plan of care and discharge plan at IDT rounds today with Case Management, Nursing, Nursing leadership, and other members of the IDT team.    Consultants/Specialty  critical care and nephrology  Gynecology-surgical oncology    Code Status  DNAR/DNI    Disposition  The patient is  not medically cleared for discharge to home or a post-acute facility.      I have placed the appropriate orders for post-discharge needs.    Review of Systems  Review of Systems   Constitutional:  Positive for malaise/fatigue. Negative for chills and fever.   HENT: Negative.     Eyes: Negative.    Respiratory: Negative.  Negative for cough.    Cardiovascular: Negative.  Negative for chest pain and palpitations.   Gastrointestinal: Negative.  Negative for heartburn, nausea and vomiting.   Genitourinary: Negative.  Negative for dysuria and frequency.   Musculoskeletal:  Positive for myalgias. Negative for back pain and neck pain.   Skin: Negative.  Negative for itching and rash.   Neurological: Negative.  Negative for dizziness, focal weakness, weakness and headaches.   Endo/Heme/Allergies: Negative.  Negative for polydipsia. Does not bruise/bleed easily.   Psychiatric/Behavioral:  Negative for depression. The patient is nervous/anxious.         Physical Exam  Temp:  [36.4 °C (97.6 °F)-36.7 °C (98 °F)] 36.5 °C (97.7 °F)  Pulse:  [84-97] 84  Resp:  [16-19] 19  BP: (101-107)/(38-54) 102/54  SpO2:  [92 %-97 %] 96 %    Physical Exam  Vitals and nursing note reviewed.   Constitutional:       Appearance: She is well-developed. She is ill-appearing. She is not diaphoretic.   HENT:      Head: Normocephalic and atraumatic.      Nose: Nose normal.   Eyes:      Conjunctiva/sclera: Conjunctivae normal.      Pupils: Pupils are equal, round, and reactive to light.   Neck:      Thyroid: No thyromegaly.      Vascular: No JVD.   Cardiovascular:      Rate and Rhythm: Normal rate and regular rhythm.      Heart sounds: Normal heart sounds.      No friction rub. No gallop.   Pulmonary:      Effort: Pulmonary effort is normal.      Breath sounds: Normal breath sounds. No wheezing or rales.   Abdominal:      General: Bowel sounds are normal. There is no distension.      Palpations: Abdomen is soft. There is no mass.      Tenderness: There  is no abdominal tenderness. There is no guarding or rebound.   Genitourinary:     Comments: Dark urine output, Enriquez  Musculoskeletal:         General: Swelling present. No tenderness. Normal range of motion.      Cervical back: Normal range of motion and neck supple.   Lymphadenopathy:      Cervical: No cervical adenopathy.   Skin:     General: Skin is warm and dry.      Coloration: Skin is pale.   Neurological:      Mental Status: She is alert and oriented to person, place, and time.      Cranial Nerves: No cranial nerve deficit.      Motor: Weakness present.   Psychiatric:         Behavior: Behavior normal.         Fluids    Intake/Output Summary (Last 24 hours) at 7/9/2023 1515  Last data filed at 7/9/2023 0634  Gross per 24 hour   Intake 2759.49 ml   Output 10 ml   Net 2749.49 ml       Laboratory  Recent Labs     07/07/23  0410 07/08/23  0315 07/09/23  0310   WBC 16.6* 16.6* 18.6*   RBC 2.82* 2.71* 2.72*   HEMOGLOBIN 7.6* 7.2* 7.4*   HEMATOCRIT 24.3* 23.7* 23.8*   MCV 86.2 87.5 87.5   MCH 27.0 26.6* 27.2   MCHC 31.3* 30.4* 31.1*   RDW 64.4* 66.6* 68.0*   PLATELETCT 85* 71* 73*   MPV 11.1 12.2 11.0     Recent Labs     07/07/23  0410 07/08/23 0315 07/09/23  0310   SODIUM 126* 123* 124*   POTASSIUM 4.8 5.3 5.2   CHLORIDE 94* 91* 91*   CO2 13* 12* 12*   GLUCOSE 87 81 102*   BUN 88* 92* 93*   CREATININE 5.09* 6.19* 6.45*   CALCIUM 9.2 8.8 8.2*                   Imaging  OUTSIDE IMAGES-CT HEAD   Final Result      CT-ABDOMEN-PELVIS W/O   Final Result      1.  Large complex right pelvic mass measuring at least 11.8 x 11.8 x 16.9 cm which could be ovarian malignancy however suboptimally evaluated without contrast.   2.  Abdominal and pelvic lymphadenopathy in keeping with chel metastases.   3.  Peritoneal metastases and trace ascites.   4.  Left posterolateral abdominal wall with continuous metastasis.   5.  10 mm lingular pulmonary nodule could be metastatic.   6.  Trace right pleural effusion.   7.  Mild right  hydroureteronephrosis likely related to partial obstruction of the right ureter from the right pelvic mass   8.  Hepatosplenomegaly and probable hepatic steatosis.         IR-CONSULT AND TREAT    (Results Pending)        Assessment/Plan  * Septic shock (HCC)- (present on admission)  Assessment & Plan  S/p treatment, ongoing treatment for UTI  Awaiting culture results  History of ESBL  History of C. difficile, currently negative  Deescalate meropenem to ceftriaxone for now    Advanced care planning/counseling discussion  Assessment & Plan  Spent over 30 minutes of face to face time spent with patient and son today discussing current clinical status, treatment options including nephrostomy tube, future dialysis, comfort care and hospice. Discussed my recommendations for hospice and comfort focused care. Patient and son would like to pursue all active medical treatment with hopes of chemotherapy in the future. Await transfer to [facility] in [place], [place] for oncology care.    Anemia requiring transfusions  Assessment & Plan  No current overt blood loss noted  Transfuse as indicated  Monitor    ESRD (end stage renal disease) (HCC)- (present on admission)  Assessment & Plan  Nephrology consulting    Hyponatremia- (present on admission)  Assessment & Plan  Multifactorial with free water overload, renal failure    Encephalopathy acute- (present on admission)  Assessment & Plan  Multifactorial, underlying malignancy, renal failure,  Overall nonfocal    Acute renal failure (HCC)- (present on admission)  Assessment & Plan  On prior CKD  Possibly dehydration with concurrent infection  Versus progressive tumor burden with obstruction  Nephrology consulting  Ongoing fluid support  Avoid nephrotoxins  Gyn oncology consulted, do not recommend surgical intervention at this time  Kidney function continues to worsen, patient and son agreeable to nephrostomy tube  IR consult placed for R nephrostomy tube vs ureter  stent  Nephrology following    High anion gap metabolic acidosis- (present on admission)  Assessment & Plan  Improving with supportive care    Endometrial carcinoma (HCC)- (present on admission)  Assessment & Plan  Advanced, follow-up CT noted  Abdominal carcinomatosis, metastatic disease  Patient generally followed at [facility]  We will involve palliative care, given the patient's progressive decline  Family struggles with remote location and limited assistance  Gyn oncology consulted, do not recommend surgical intervention for possible ureter obstruction; if kidney function worsens can consider stent placement vs nephrostomy tube  Spoke with Dr Mcginnis, partner of patient's oncologist Dr Terrell at [facility], she requests transfer of patient to [facility] for oncology care; transfer center notified, pending transfer    Hypothyroidism- (present on admission)  Assessment & Plan  Continue replacement, recheck TFTs    HLD (hyperlipidemia)- (present on admission)  Assessment & Plan  History of, currently not a candidate for treatment    DM (diabetes mellitus) (HCC)- (present on admission)  Assessment & Plan  Ongoing glycemic control, monitoring             VTE prophylaxis: SCDs/TEDs

## 2023-07-09 NOTE — CARE PLAN
The patient is Watcher - Medium risk of patient condition declining or worsening    Shift Goals  Clinical Goals: safety; maintain skin integrity; pain management  Patient Goals: pain control  Family Goals: pain management    Progress made toward(s) clinical / shift goals:  Pin controlled. Q2 turns to help prevent skin break down    Patient is not progressing towards the following goals:      Problem: Knowledge Deficit - Standard  Goal: Patient and family/care givers will demonstrate understanding of plan of care, disease process/condition, diagnostic tests and medications  Outcome: Progressing     Problem: Skin Integrity  Goal: Skin integrity is maintained or improved  Outcome: Progressing     Problem: Pain - Standard  Goal: Alleviation of pain or a reduction in pain to the patient’s comfort goal  Outcome: Progressing

## 2023-07-09 NOTE — PROGRESS NOTES
Assumed care for pt.  Ao x 4, flat and withdraw.  Denies pain.  Meds passed per MAR, no injuries this shift.  Call light in reach, bed at lowest position, no needs at this time.

## 2023-07-09 NOTE — PROGRESS NOTES
NOC HOSPITALIST CROSS COVER    Notified by RN regarding patient having no urine output and wheezy lung sounds.  She was receiving NS at 150 mL/h and sodium bicarbonate at 100 mL/h.  Nursing concern for lung sounds being wheezy, poor urinary output, and concern for possible fluid overload given the high rate of fluids and poor urinary output.  Serum osmolality  and BNP ordered.    Vitals:    07/09/23 0431   BP: (!) 101/38   Pulse: 90   Resp: 16   Temp: 36.4 °C (97.6 °F)   SpO2: 95%        Plan:  -Decrease NS to 75 mL/h  -Monitor closely for fluid overload  -Continue to trend BMP and sodium closely  -BNP elevated at 4664  -Requested the bedside RN flush Enriquez and clamp to determine if it is functioning appropriately  -Insulin updated to q6 hours secondary to poor oral intake and persistent hypoglycemia  -Lantus MAR held while patient remains hypoglycemic    Discussed patient case with collaborating physician who agrees with plan of care.  -----------------------------------------------------------------------------------------------------------    Electronically signed by:  Neil Perez, JOVANY, APRN, CONCETTAP-BC  Hospitalist Services

## 2023-07-09 NOTE — CARE PLAN
The patient is Stable - Low risk of patient condition declining or worsening    Shift Goals  Clinical Goals: safety, skin integrity, pain mgmt  Patient Goals: pain mgmt  Family Goals: JANEL    Progress made toward(s) clinical / shift goals:  pt meds passed per MAR, no injuries this shift      Problem: Knowledge Deficit - Standard  Goal: Patient and family/care givers will demonstrate understanding of plan of care, disease process/condition, diagnostic tests and medications  Outcome: Progressing     Problem: Skin Integrity  Goal: Skin integrity is maintained or improved  Outcome: Progressing     Problem: Pain - Standard  Goal: Alleviation of pain or a reduction in pain to the patient’s comfort goal  Outcome: Progressing     Problem: Fall Risk  Goal: Patient will remain free from falls  Outcome: Progressing       Patient is not progressing towards the following goals:

## 2023-07-10 PROBLEM — J96.02 ACUTE RESPIRATORY FAILURE WITH HYPOXIA AND HYPERCAPNIA (HCC): Status: ACTIVE | Noted: 2023-01-01

## 2023-07-10 PROBLEM — J96.01 ACUTE RESPIRATORY FAILURE WITH HYPOXIA AND HYPERCAPNIA (HCC): Status: ACTIVE | Noted: 2023-01-01

## 2023-07-10 NOTE — PROGRESS NOTES
Assumed pt care.  Pt ao x 4, withdrawn and lethargic.  Pt meds passed per MAR, no injuries this shift.  Call light in reach, bed at lowest position, no needs at this time.

## 2023-07-10 NOTE — PROGRESS NOTES
Hospital Medicine Daily Progress Note    Date of Service  7/10/2023    Chief Complaint  Tena Chaudhry is a 62 y.o. female admitted 7/5/2023 with weakness, encephalopathy, UTI from an outlying facility    Hospital Course  Tena Chaudhry is a 62 y.o. female past medical history diabetes type 2, hyperlipidemia, hypothyroidism, chronic kidney disease, and advanced stage 4 endometrial carcinoma previously treated [facility] gyn/onc and infusions, recently on oral chemotherapy, who presented 7/5/2023 with acute encephalopathy, acute renal failure and urosepsis transferred from outside hospital [facility] in [place], [place].  She had complaint of 8 days worsening confusion lack of appetite poor p.o. intake family noticed she has had difference in behavior including confusion, she felt fatigued with low energy, as well as complaint of dysuria and frequency including uncomfortable urination.  [facility] emergency department, laboratories significant for hyponatremia as well as acute renal failure in the setting of CKD with creatinine up to 5.1 from baseline 3.5, leukocytosis 13.4, UA with positive nitrates positive leuk esterase  white blood cells per high-powered field and hyaline casts at 30-50.  She had a anion gap metabolic acidosis. Given her history of ESBL urinary tract infection she was started empirically on ertapenem and vancomycin. Transferred for higher level of care, nephrology evaluation.    Patient has continued to have acute on chronic renal failure. CT scan showing large pelvic mass with mild right hydronephrosis due to ureter obstruction from mass. CT scan also shows widespread metastasis including peritoneal carcinomatosis and possible lung metastasis. Nephrology consulted, currently no urgent indication for dialysis. Given right kidney obstruction, IR consulted for right nephrostomy tube placement. Blood and urine cultures from [facility] negative with no growth to date. Antibiotics  deescalated from meropenem to ceftriaxone. Patient on IV antibiotics and IV fluids for treatment of UTI, metabolic acidosis, acute renal failure. Discussed with patient and patient's son Kiran at bedside in length regarding patient's poor prognosis and likely uncurable stage 4 endometrial cancer. Discussed my recommendations for hospice and comfort focused care. Patient and son ultimately would like to continue all medical treatment and continued chemotherapy in the future. Discussed with patient's oncology team in [place], they request transfer to [facility] in [place], [place] for continued medical treatment and continuity of care. Transfer center notified, working on acute to acute hospital transfer.    Interval Problem Update  No acute events overnight.  Hgb stable 7.5.   Leukocytosis stable 17.4k.  BUN 94, Cr 5.8.  NPO, plan for IR guided R nephrostomy tube today. Discussed with IR. INR elevated 1.95, but TEG acceptable for IR.  Continue IV fluids and antibiotics for UTI.  Transfer center working on transfer to [facility] for oncology care.  Palliative care following, appreciate recommendations.    Patient has high medical complexity, complex decision making and is at high risk for complication, morbidity, and mortality.      I have discussed this patient's plan of care and discharge plan at IDT rounds today with Case Management, Nursing, Nursing leadership, and other members of the IDT team.    Consultants/Specialty  critical care and nephrology  Gynecology-surgical oncology    Code Status  DNAR/DNI    Disposition  The patient is not medically cleared for discharge to home or a post-acute facility.  Anticipate discharge to: a short-term general hospital for inpatient care    I have placed the appropriate orders for post-discharge needs.    Review of Systems  Review of Systems   Constitutional:  Positive for malaise/fatigue. Negative for chills and fever.   HENT: Negative.     Eyes: Negative.     Respiratory: Negative.  Negative for cough.    Cardiovascular: Negative.  Negative for chest pain and palpitations.   Gastrointestinal: Negative.  Negative for heartburn, nausea and vomiting.   Genitourinary: Negative.  Negative for dysuria and frequency.   Musculoskeletal:  Positive for myalgias. Negative for back pain and neck pain.   Skin: Negative.  Negative for itching and rash.   Neurological: Negative.  Negative for dizziness, focal weakness, weakness and headaches.   Endo/Heme/Allergies: Negative.  Negative for polydipsia. Does not bruise/bleed easily.   Psychiatric/Behavioral:  Negative for depression. The patient is nervous/anxious.         Physical Exam  Temp:  [36.1 °C (97 °F)-36.2 °C (97.2 °F)] 36.1 °C (97 °F)  Pulse:  [79-98] 97  Resp:  [17-18] 18  BP: ()/(43-53) 98/43  SpO2:  [88 %-96 %] 96 %    Physical Exam  Vitals and nursing note reviewed.   Constitutional:       Appearance: She is well-developed. She is ill-appearing. She is not diaphoretic.   HENT:      Head: Normocephalic and atraumatic.      Nose: Nose normal.   Eyes:      Conjunctiva/sclera: Conjunctivae normal.      Pupils: Pupils are equal, round, and reactive to light.   Neck:      Thyroid: No thyromegaly.      Vascular: No JVD.   Cardiovascular:      Rate and Rhythm: Normal rate and regular rhythm.      Heart sounds: Normal heart sounds.      No friction rub. No gallop.   Pulmonary:      Effort: Pulmonary effort is normal.      Breath sounds: Normal breath sounds. No wheezing or rales.   Abdominal:      General: Bowel sounds are normal. There is no distension.      Palpations: Abdomen is soft. There is no mass.      Tenderness: There is no abdominal tenderness. There is no guarding or rebound.   Genitourinary:     Comments: Dark urine output, Enriquez  Musculoskeletal:         General: Swelling present. No tenderness. Normal range of motion.      Cervical back: Normal range of motion and neck supple.   Lymphadenopathy:      Cervical:  No cervical adenopathy.   Skin:     General: Skin is warm and dry.      Coloration: Skin is pale.   Neurological:      Mental Status: She is alert and oriented to person, place, and time.      Cranial Nerves: No cranial nerve deficit.      Motor: Weakness present.   Psychiatric:         Behavior: Behavior normal.         Fluids  No intake or output data in the 24 hours ending 07/10/23 1437      Laboratory  Recent Labs     07/08/23  0315 07/09/23  0310 07/10/23  0657   WBC 16.6* 18.6* 17.4*   RBC 2.71* 2.72* 2.80*   HEMOGLOBIN 7.2* 7.4* 7.5*   HEMATOCRIT 23.7* 23.8* 23.9*   MCV 87.5 87.5 85.4   MCH 26.6* 27.2 26.8*   MCHC 30.4* 31.1* 31.4*   RDW 66.6* 68.0* 65.1*   PLATELETCT 71* 73* 64*   MPV 12.2 11.0 11.8     Recent Labs     07/08/23 0315 07/09/23  0310 07/10/23  0657   SODIUM 123* 124* 124*   POTASSIUM 5.3 5.2 4.8   CHLORIDE 91* 91* 89*   CO2 12* 12* 11*   GLUCOSE 81 102* 94   BUN 92* 93* 94*   CREATININE 6.19* 6.45* 5.80*   CALCIUM 8.8 8.2* 7.8*     Recent Labs     07/10/23  0808   INR 1.95*               Imaging  OUTSIDE IMAGES-CT HEAD   Final Result      CT-ABDOMEN-PELVIS W/O   Final Result      1.  Large complex right pelvic mass measuring at least 11.8 x 11.8 x 16.9 cm which could be ovarian malignancy however suboptimally evaluated without contrast.   2.  Abdominal and pelvic lymphadenopathy in keeping with chel metastases.   3.  Peritoneal metastases and trace ascites.   4.  Left posterolateral abdominal wall with continuous metastasis.   5.  10 mm lingular pulmonary nodule could be metastatic.   6.  Trace right pleural effusion.   7.  Mild right hydroureteronephrosis likely related to partial obstruction of the right ureter from the right pelvic mass   8.  Hepatosplenomegaly and probable hepatic steatosis.         IR-NEPHROSTOGRAM W/ NEW TUBE PLACEMENT (ALL RADIOLOGY) RIGHT    (Results Pending)        Assessment/Plan  * Septic shock (HCC)- (present on admission)  Assessment & Plan  S/p treatment,  ongoing treatment for UTI  Awaiting culture results  History of ESBL  History of C. difficile, currently negative  Deescalate meropenem to ceftriaxone for now    Advanced care planning/counseling discussion  Assessment & Plan  Spent over 30 minutes of face to face time spent with patient and son today discussing current clinical status, treatment options including nephrostomy tube, future dialysis, comfort care and hospice. Discussed my recommendations for hospice and comfort focused care. Patient and son would like to pursue all active medical treatment with hopes of chemotherapy in the future. Await transfer to [facility] in [place], [place] for oncology care.    Anemia requiring transfusions  Assessment & Plan  No current overt blood loss noted  Transfuse as indicated  Monitor    ESRD (end stage renal disease) (HCC)- (present on admission)  Assessment & Plan  Nephrology consulting    Hyponatremia- (present on admission)  Assessment & Plan  Multifactorial with renal failure    Encephalopathy acute- (present on admission)  Assessment & Plan  Multifactorial, underlying malignancy, renal failure,  Overall nonfocal  improving    Acute renal failure (HCC)- (present on admission)  Assessment & Plan  On prior CKD  Possibly dehydration with concurrent infection  Versus progressive tumor burden with obstruction  Ongoing fluid support  Avoid nephrotoxins  Kidney function continues to worsen, patient and son agreeable to nephrostomy tube  IR consult placed for R nephrostomy tube vs ureter stent, NPO IR to place today  Nephrology following    High anion gap metabolic acidosis- (present on admission)  Assessment & Plan  On IV bicarb  Nephrology following    Endometrial carcinoma (HCC)- (present on admission)  Assessment & Plan  Advanced, follow-up CT noted  Stage 4 with abdominal carcinomatosis, metastatic disease  Patient generally followed at [facility]  We will involve palliative care, given the patient's progressive  decline  Family struggles with remote location and limited assistance  Gyn oncology consulted, recommend nephrostomy tube via IR if kidney function worsens  Patient and son interested in continuing chemotherapy in the future  Palliative care following  Spoke with Dr Mcginnis, partner of patient's oncologist Dr Terrell at [facility], she requests transfer of patient to [facility] in [facility] for oncology care; transfer center notified, pending transfer    Hypothyroidism- (present on admission)  Assessment & Plan  Continue replacement, recheck TFTs    HLD (hyperlipidemia)- (present on admission)  Assessment & Plan  History of, currently not a candidate for treatment    DM (diabetes mellitus) (HCC)- (present on admission)  Assessment & Plan  Ongoing glycemic control, monitoring             VTE prophylaxis: SCDs/TEDs

## 2023-07-10 NOTE — THERAPY
Occupational Therapy Contact Note    Patient Name: Tena Chaudhry  Age:  62 y.o., Sex:  female  Medical Record #: [id number]  Today's Date: 7/10/2023    Discussed missed therapy with Jordon       07/10/23 8689   Interdisciplinary Plan of Care Collaboration   Collaboration Comments OT eval attempted, pt is awaiting acceptance to transfer to another facility for further Oncology tx.  Will defer Acute OT services at this time.

## 2023-07-10 NOTE — CARE PLAN
The patient is Stable - Low risk of patient condition declining or worsening    Shift Goals  Clinical Goals: safety, pain mgmt  Patient Goals: comfort, sleep  Family Goals: JANEL    Progress made toward(s) clinical / shift goals:  pt meds passed per MAR, no injuries this shift      Problem: Knowledge Deficit - Standard  Goal: Patient and family/care givers will demonstrate understanding of plan of care, disease process/condition, diagnostic tests and medications  Outcome: Progressing     Problem: Skin Integrity  Goal: Skin integrity is maintained or improved  Outcome: Progressing     Problem: Pain - Standard  Goal: Alleviation of pain or a reduction in pain to the patient’s comfort goal  Outcome: Progressing     Problem: Fall Risk  Goal: Patient will remain free from falls  Outcome: Progressing       Patient is not progressing towards the following goals:

## 2023-07-10 NOTE — DISCHARGE PLANNING
"Case discussed with HCM leader Grace.  RT is attempting to transfer the patient to [facility], and insurance authorization is likely required.  Grace attempted to call, but the prior authorization intake for Santhosh was unable to submit a request due to the system being down.  This RN PARAG logged into the [employer] portal and attempted to submit the request, received the error message \"[employer] is currently unable to complete your request. Please try this request again later. If the problem continues, contact [employer] Client [employer] at [phone]).\"  RN PARAG updated RTOC RN Jody.      @[id number] - RN PARAG was able to successfully submit the prior authorization request onto [employer].  Copy of request scanned into the media tab.    "

## 2023-07-10 NOTE — OR SURGEON
Immediate Post- Operative Note        Findings: R hydro.      Procedure(s): R antegrade nephrostogram with PCN placement.       Estimated Blood Loss: Less than 5 ml        Complications: Perforation of the renal collecting system. Respiratory arrest resulting in Code Blue and intubation. Patient transferred to ICU.             7/10/2023     1744 PM     Brandon Segundo M.D.

## 2023-07-10 NOTE — DISCHARGE PLANNING
Case discussed with Duyen SUMMERS CM, she received a voicemail from Santhosh Loera.  The voicemail left by Luz Maria indicated that there were insurance barriers in regards to the requested transfer to [facility].  This RN CM attempted to reach Luz Maria at [phone], left a detailed message requesting a call back.

## 2023-07-10 NOTE — HOSPITAL COURSE
This is a 6-year-old female with past medical history of type 2 diabetes, dyslipidemia, hypothyroidism, CKD and advanced stage 4 endometrial carcinoma previously treated [facility] gyn/onc and infusions, recently on oral chemotherapy, who presented 7/5/2023 with acute encephalopathy, acute on chronic renal failure and urosepsis transferred from outside hospital.    CT imaging noted large pelvic mass with mild right hydronephrosis due to ureter obstruction from mass, widespread metastasis including peritoneal carcinomatosis and possible lung metastasis.  IR was consulted for nephrostomy tube placement, 7/10.    Patient was empirically started on meropenem given history of ESBL, de-escalated to Rocephin.    Nephrology was consulted, no recommendation for dialysis at this time.    Extensive conversation between patient's son in regards to prognosis, currently both patient and patient's son would like to continue all medical treatment.  Prior hospitalist discussed with patient's oncology team in [place], requesting transfer to their facility.    Labs, transfer, extend abx to 7-10 days, med rec.

## 2023-07-10 NOTE — PROGRESS NOTES
Pt presents to IR 1.   Patient's son was consented by MD via phone conversation, code status was discussed in length, son ultimately decided to change DNAR/DNI to full code for procedure only and confirmed by this RN. Consent witnessed with second RN.   Pt transferred to IR 1 table in prone position. Patient underwent a right nephrostomy tube placement by Dr. Segundo. Procedure site was marked by MD and verified using imaging guidance. Pt placed on monitor, prepped and draped in a sterile fashion. Vitals were taken every 5 minutes and remained stable during procedure (see doc flow sheet for results). CO2 waveform capnography was monitored and remained WNL throughout procedure.     Report called to Dino SUMMERS.     [facility]  Angiographic Catheter   4F 65 cm  REF: [id number]   LOT: [id number]  EXP: 10/21/2025      CODE    1650 Once patient positioned supine from IR table, patient became unresponsive with agonal breathing. Started bagging patient with 100% FI02.  Code called    1653 Code team at bedside. Patient's blood pressure dropping.   1659 300 mcg neosynephrine given IVP by Dr. Schofield    1702 Dr. Segundo speaking with family regarding if family would like to intubate since patient was full code for procedure, suspended DNAR/DNI     1702 Family stated that they wanted to intubate.    1704 Intensivist at head of bed to intubate.     1709 etomidate 20 mg, rocuronium 50 mg, flushed. 300 mcg neosynephrine given by intensivist Dr. Schofield    1711 glidoscope used for intubation- intubated     1712 patient has bed     1717 patient transported to T616 by RN and two Rns.    1725 report given to Jaret SUMMERS at bedside

## 2023-07-10 NOTE — CARE PLAN
Problem: Knowledge Deficit - Standard  Goal: Patient and family/care givers will demonstrate understanding of plan of care, disease process/condition, diagnostic tests and medications  Outcome: Progressing     Problem: Skin Integrity  Goal: Skin integrity is maintained or improved  Outcome: Progressing     Problem: Pain - Standard  Goal: Alleviation of pain or a reduction in pain to the patient’s comfort goal  Outcome: Progressing     Problem: Fall Risk  Goal: Patient will remain free from falls  Outcome: Progressing   The patient is Stable - Low risk of patient condition declining or worsening    Shift Goals  Clinical Goals: Safety, pain management  Patient Goals: comfort and sleep  Family Goals: JANEL    Progress made toward(s) clinical / shift goals:  Patient resting in bed, flat affect, complains of no pain. Bed is locked and in lowest position, call bell within reach of patient.    Patient is not progressing towards the following goals:

## 2023-07-10 NOTE — THERAPY
Physical Therapy   Initial Evaluation     Patient Name: Tena Chaudhry  Age:  62 y.o., Sex:  female  Medical Record #: [id number]  Today's Date: 7/10/2023     Precautions  Precautions: Fall Risk  Comments: significant BLE edema, weeping    Assessment  Patient is 62 y.o. female that presented to acute from OSH with complaints of weakness, encephalopathy, UTI. Medical dx of septic shock. PMHx significant for DM2, HLD, hypothyroidism, CKD, advanced stage 4 endometrial carcinoma treated with infusions and oral chemo. EMR indicates patient planned for transfer to [place], [place] for further chemo treatment but no transfer date indicated in chart. She presented to PT with apparent impaired cognition, functional weakness, BLE edema, and decreased activity tolerance which are limiting her ability to safely perform mobility at [facility]. Mobility as detailed below; patient was in chair on entry and RN reported she required min A to transfer EOB to chair. She was unable to stand during evaluation despite multiple attempts with max A, patient demonstrated limited volition and effort with attempts. Will follow while in house.    Plan    Physical Therapy Initial Treatment Plan   Treatment Plan : Bed Mobility, Equipment, Gait Training, Manual Therapy, Neuro Re-Education / Balance, Self Care / Home Evaluation, Therapeutic Activities, Therapeutic Exercise  Treatment Frequency: 2 Times per Week  Duration: Until Therapy Goals Met    DC Equipment Recommendations: Unable to determine at this time  Discharge Recommendations: Recommend post-acute placement for additional physical therapy services prior to discharge home     Objective       07/10/23 1440   Charge Group   PT Evaluation PT Evaluation Mod   Total Time Spent   PT Total Time Yes   PT Evaluation Time Spent (Mins) 15   PT Total Time Spent (Calculated) 15   Initial Contact Note    Initial Contact Note Order Received and Verified, Physical Therapy Evaluation in Progress with Full Report  to Follow.   Precautions   Precautions Fall Risk   Comments significant BLE edema, weeping   Vitals   O2 (LPM) 0   O2 Delivery Device None - Room Air   Pain 0 - 10 Group   Therapist Pain Assessment   (no pain complaint during session)   Prior Living Situation   Prior Services Unable To Determine At This Time   Comments Patient reported she lives in [place] in [place] but unclear if patient is reliable historian. Need to verify   Prior Level of Functional Mobility   Bed Mobility Unable To Determine At This Time   Comments Patient reported she was independent weeks ago, unclear if report is accurrate   Cognition    Cognition / Consciousness X   Level of Consciousness Alert   Comments gross speech and motor delay. limited verbalizations. blank gaze   Strength Lower Body   Comments gross BLE weakness, anticipate patient also limited by other factors at time of eval   Balance Assessment   Sitting Balance (Static) Fair -   Sitting Balance (Dynamic) Fair -   Weight Shift Sitting Poor   Bed Mobility    Comments in chair pre and post   Gait Analysis   Gait Level Of Assist Unable to Participate   Functional Mobility   Sit to Stand Maximal Assist  (for attempt; patient with limited volition and effort so no lift off achieved)   Comments RN reported patient was min A for transfer EOB to chair earlier, he attributed difference in performance to behavior   How much difficulty does the patient currently have...   Turning over in bed (including adjusting bedclothes, sheets and blankets)? 1   Sitting down on and standing up from a chair with arms (e.g., wheelchair, bedside commode, etc.) 1   Moving from lying on back to sitting on the side of the bed? 1   How much help from another person does the patient currently need...   Moving to and from a bed to a chair (including a wheelchair)? 1   Need to walk in a hospital room? 1   Climbing 3-5 steps with a railing? 1   6 clicks Mobility Score 6   Patient / Family Goals    Patient / Family  Goal #1 none stated   Short Term Goals    Short Term Goal # 1 Patient will move supine <> sitting EOB with min A within 6tx in order to get in/out of bed   Short Term Goal # 2 Patient will move sitting <> standing with LRAD and min A within 6tx in order to initiate transfers and gait   Short Term Goal # 3 patient will ambulate 15ft with LRAD and min A within 6tx in order to access environment   Education Group   Education Provided Role of Physical Therapist   Role of Physical Therapist Patient Response Patient;Acceptance;Explanation;No Learning Evidence   Physical Therapy Initial Treatment Plan    Treatment Plan  Bed Mobility;Equipment;Gait Training;Manual Therapy;Neuro Re-Education / Balance;Self Care / Home Evaluation;Therapeutic Activities;Therapeutic Exercise   Treatment Frequency 2 Times per Week   Duration Until Therapy Goals Met   Problem List    Problems Impaired Bed Mobility;Impaired Transfers;Impaired Ambulation;Functional ROM Deficit;Functional Strength Deficit;Impaired Balance;Impaired Coordination;Decreased Activity Tolerance;Safety Awareness Deficits / Cognition;Motor Planning / Sequencing   Anticipated Discharge Equipment and Recommendations   DC Equipment Recommendations Unable to determine at this time   Discharge Recommendations Recommend post-acute placement for additional physical therapy services prior to discharge home   Interdisciplinary Plan of Care Collaboration   IDT Collaboration with  Nursing   Patient Position at End of Therapy Seated;Chair Alarm On;Call Light within Reach;Tray Table within Reach;Phone within Reach   Collaboration Comments RN present during attempts to stand   Session Information   Date / Session Number  7/10-1 (1/2, 7/16)

## 2023-07-10 NOTE — CONSULTS
Inpatient Palliative Medicine - Follow Up     Tena Chaudhry  62 y.o. female  [id number]    Location: [facility]  PCP: Padmini Lunsford M.D.  Referral Source: Eb Dominguez M.d.    Reason for palliative medicine consultation and/or visit: ACP         Assessment and Plan:     GOAL(S) OF CARE = LONGEVITY  7/10 PCMD recommended hospice for end of life care.  No transfer yet.  7/11 pending [facility] tele oncology visit at 0845.  Will explore   A. HD possibility? B. [facility] onc recommendations.    SYMPTOMS ETIOLOGY/CAUSES = none distressing    PROGNOSIS = POOR, as in days-to-weeks, favoring early active death from cancer-related multi-organ failure  - YES technically hospice-eligible = widespread CA, low function pps=50%<70% ECOG3, progressively gyn cancer despite on 3rd line treatment, >10% weight loss in 6mo (259==>228 pounds), ALB=2.1-2.5  - NOT YET hospice-appropriate = patient did NOT commit to a decision yet regarding continued care vs hospice, pending AM [facility] tele-onc visit tomorrow AM.     CODE STATUS = DNAR DNI      ADVANCE CARE PLANNING =   Salient clinical updates.  Prognostication and review treatment options  Continue onc treatments +/- nephro interventions, for days-to-weeks more longevity.  Comfort care transitions then home with hospice following.  3.   Assessed future treatment challenges.  4.  Provided my professional recommendation: home with hospice following    PALLIATIVE CARE TEAM INTERVENTIONS =   Advance care planning.  Care coordination.  Emotional support.  Will plan to join patient's telemedicine visit with her [facility] oncologist @ 0845 tomorrow 7/11/23.          Summary:   Tena Chaudhry is a 62 y.o. female with progressive metastatic gynecological/endometrial cancer first biopsied FEB21, sp surgeries and now on 3rd line treatment with [facility] Oncology.  Secondary history is note for DM2, DM2, HLD, morbid obesity>BMI40, etc. Patient was transferred here from a local hospital in [place], [place] , due to worsening  encephalopathy and poor oral intake x8 days. Patient has so far been treated for sepsis and oligouric KAREN.  ---------------------------------------------------------------------------------------------------------------------------------------------    Met with patient and her son Kiran bedside, with their agreement to proceed with this follow up encounter.  Kiran had many questions regarding - [facility] transfer? Prognosis? New clinical developments.  All of which were answered and addressed to his satisfaction.    In short, patient has become almost anuric since yesterday, and she is NOT a good dialysis candidate due to her worsening stage IV GYN cancer    We reviewed this clinical challenge and 2 foreseeable clinical options together.    A. Transfer to [facility] as previously planned, with the hope of resuming cancer treatment after nephrology intervention to improve renal function.  No guarantee if or not hemodialysis is going to be possible, as  the current practice standards recommend generally against starting dialysis on a patient whose renal function is declining cue to worsening malignancy.  This will remain unknown until after the transfer when pt meets her [facility] nephrologist, if at all.    B. Second options is hospice care at home, knowing that irregardless of any cancer and nephrology interventions, patient's prognosis is likely already determined for the most part.  More interventions will likely only result in marginal prognosis improvement, while not improving cancer.  Thus I recommend home hospice care, for better qualify of life in patient's final days-to-weeks, and to live without having to worry about her terminal cancer.    Kiran and I discussed extensively with patient bedside.  She was not surprised by this topic nor my recommendation.    Patient remained for the most part reticent... she made no decision today, nor did her share much of her mind with her friend....    Patient does have a  tele-oncology phone visit with patient's [facility] oncologist tomorrow, Braulio Amor, and I agreed to join them at 0845 tomorrow, to further discuss treatment/disposition options.        Advance care planning:  The patient and/or legal decision maker has provided voluntary consent to discuss advance care planning. We discussed code status.  DNAR DNI    Total time spent in ACP discussion 30 minutes, which is separate from the time spent completing the evaluation and management visit.     Thank you for allowing me the opportunity to participate in the care of Tena Chaudhry     I spent a total of 35 minutes reviewing medical records, direct face-to-face time with the patient and/or family, documentation and coordination of care. This is separate from the time spent on advance care planning, which is documented above.         [facility]   [id number]  [id number]  P: [phone]  F: [phone]  C: [phone]

## 2023-07-11 PROBLEM — N18.9 ACUTE-ON-CHRONIC KIDNEY INJURY (HCC): Status: ACTIVE | Noted: 2023-01-01

## 2023-07-11 PROBLEM — E87.1 HYPONATREMIA: Status: RESOLVED | Noted: 2023-01-01 | Resolved: 2023-01-01

## 2023-07-11 NOTE — PROGRESS NOTES
Inpatient Palliative Medicine - Follow Up     Tena Chaudhry  62 y.o. female  [id number]    Location: [facility]  PCP: Padmini Lunsford M.D.  Referral Source: Maxx Hale M.d.    Reason for palliative medicine consultation and/or visit: Advance Care Planning         Assessment and Plan:     GOAL(S) OF CARE = pending COMFORT CARE transition & compassionate life support withdrawal later today, after family visits    SYMPTOMS ETIOLOGY/CAUSES = suspended/delayed early active death due to cancer induced multi-organ failure, currently on ventilation    PROGNOSIS = Early active death, likely to die first 24-72 hours (conservative estimates) post extubation, if not much quicker   - YES hospice-eligible  - YES hospice-appropriate = family elected to move towards comfort care transition later today.      CODE STATUS = DNAR I-OK at this time ==> COMFORT CARE DNAR DNI later today after comfort care transition, per ICU protocols      ADVANCE CARE PLANNING =   Reviewed salient recent history/clinical events.  Provided medical updates to [facility] Oncologist Dr. Terrell over telehealth.  Reviewed philosophy and general provisions of comfort care with family.    PALLIATIVE CARE TEAM INTERVENTIONS =   Advance care planning.  Joined joined family meeting with ICU team led by Dr. Hale.  Joined joined family meeting over telehealth with [facility] Onc Dr. Terrell.  Spiritual consultation placed for  visitation, prior to end of life.  Emotional support & therapeutic presence family members.        Summary:   Tena Chaudhry is a 62 y.o. female with progressive metastatic gynecological/endometrial cancer first biopsied FEB21, sp surgeries and now on 3rd line treatment with [facility].  Secondary history is note for DM2, DM2, HLD, morbid obesity>BMI40, etc. Patient was transferred here from a local hospital in [place], [place] , due to worsening encephalopathy and poor oral intake x8 days. Patient has so far been  treated for sepsis and oligouric KAREN.    Due to worsening KAREN on CKD, patient underwent R nephrostomy tube placement yesterday. The procedure unfortunately was compicated by renal collecting system injury and also patient's respiratory distress.  Patient was transported to ICU for pressor support and stabilization, on vent.  Code status appropriately changed to DNAR I-OK to reflect the sequence of events.  ---------------------------------------------------------------------------------------------------------------------------------------------     Joined family member Kiran & Tyrone montague with ICU team led by attending physician Dr. Hale.  Clinical events reviewed and poor prognosis clearly conveyed by medical team members.  Family were rightfully grief-stricken but understanding of this bad situation.      Patient's son Kiran has had multiple discussions with family members and also medical team members.  He informed us that family have reached a consensus to move towards comfort care transition later today, after family visits.  Family understand the non-beneficial nature of prolonged life support and elect for compassionate life support withdrawal today, when they are ready and give the OK.    Spiritual care order placed for  visit.    I also had a telehealth visit with Dr. Terrell and family.  Provided overall medical updates to Dr. Terrell, who also found comfort care transition very reasonable given the challenging circumstance.    Family are appreciative of medical team's interventions and clear communications.  They will let us know when they are ready to proceed with comfort care transition.         Advance care planning:    Total time spent in ACP discussion 30 minutes, which is separate from the time spent completing the evaluation and management visit.     Thank you for allowing me the opportunity to participate in the care of Tena Chaudhry      I spent a total of 35  minutes reviewing medical records, direct face-to-face time with the patient and/or family, documentation and coordination of care. This is separate from the time spent on advance care planning, which is documented above.         ARTURO ROBERTSON) DO OBI  [facility]   [address] [place] [place] [address]  [id number]  P: [phone]  F: [phone]  C: [phone]

## 2023-07-11 NOTE — ASSESSMENT & PLAN NOTE
Acute on chronic kidney disease.  Possibly likely secondary to postrenal etiology complicated by prerenal azotemia in the setting of sepsis and significant hypotension causing acute tubular necrosis.  Patient is significantly hypervolemic at this point.  We will continue to monitor urine output, nephrostomy tube output and then monitor.  We will appreciate nephrology input regarding possible dialysis.  Discussed with nephrology plan is to continue bicarb amp pushes at this time.  Avoid bicarb infusion given significant volume overload.  Appreciate nephrology input.

## 2023-07-11 NOTE — DIETARY
"Nutrition Services:    RD consulted for TF, however per palliative and MD note, pt's family likely to transition to comfort care \"after family and  have visited her. \" RD spoke with RN regarding TF, will monitor for now.     RD following.  "

## 2023-07-11 NOTE — DISCHARGE PLANNING
Late Entry:  CODE BLUE    SW responded to code blue in IR1. Around 1700. Family was in waiting room and was updated by physician. SW met with family after they spoke to the Doctor and requested time alone , not wanting any SW Intervention.     [facility]    SW remain available for any continued needs or support.

## 2023-07-11 NOTE — DISCHARGE PLANNING
Received VM from Emeli Michaels RN that hospital auth has been approved for [facility]. Updated Be HUBER and Eleanor HODGE RN regarding VM.

## 2023-07-11 NOTE — ASSESSMENT & PLAN NOTE
CT with mild right hydroureteonephrosis likely related to partial obstruction from carcinoma  S/P right percutaneous nephrostomy on 7/10 complicated by perforation of renal collecting system and near respiratory arrest requiring emergent intubation  Very low urine output  Renal function is worse  Not a candidate for hemodialysis in my opinion given her overall poor prognosis  Monitor renal function and urine output  Avoid nephrotoxins and renal dose medications

## 2023-07-11 NOTE — CARE PLAN
Problem: Skin Integrity  Goal: Skin integrity is maintained or improved  Outcome: Progressing     Problem: Fall Risk  Goal: Patient will remain free from falls  Outcome: Progressing     Problem: Hemodynamics  Goal: Patient's hemodynamics, fluid balance and neurologic status will be stable or improve  Outcome: Progressing     Problem: Pain - Standard  Goal: Alleviation of pain or a reduction in pain to the patient’s comfort goal  7/11/2023 0424 by Naomi Salmeron RArianaN.  Outcome: Progressing  7/11/2023 0420 by Naomi Salmeron R.N.  Note: Maintained adequate pain control with use of medication.      Problem: Mechanical Ventilation  Goal: Safe management of artificial airway and ventilation  Outcome: Progressing     Problem: Safety - Medical Restraint  Goal: Remains free of injury from restraints (Restraint for Interference with Medical Device)  Outcome: Progressing  Note: Patient remains free from injury related to medical restraints.    The patient is Unstable - High likelihood or risk of patient condition declining or worsening    Shift Goals  Clinical Goals: Maintain hemodynmaics  Patient Goals: JANEL  Family Goals: Comfort and updates    Progress made toward(s) clinical / shift goals:  Progression toward goals as stated above.    Patient is not progressing towards the following goals: NA

## 2023-07-11 NOTE — ASSESSMENT & PLAN NOTE
Stage IV endometrial carcinoma  CT confirms large right pelvic mass with abdominal and pelvic lymphadenopathy, peritoneal metastases and possible hepatic metastases  Treated at [facility] with neoadjuvant chemotherapy, surgical resection, adjuvant chemotherapy, salvage chemotherapy and radiation to her vaginal cuff  She has had progression of disease despite aggressive therapy

## 2023-07-11 NOTE — PROGRESS NOTES
4 Eyes Skin Assessment Completed by KRISTOPHER Polo and KRISTOPHER Gill.    Head WDL  Ears WDL  Nose WDL  Mouth WDL  Neck WDL  Breast/Chest Bruising  Shoulder Blades WDL  Spine WDL  (R) Arm/Elbow/Hand Bruising  (L) Arm/Elbow/Hand Bruising and Scab Tear  Abdomen Bruising  Groin Tear  Scrotum/Coccyx/Buttocks Excoriation and Moisture Fissure Moisture associated damage   (R) Leg Edema  (L) Leg Edema  (R) Heel/Foot/Toe WDL  (L) Heel/Foot/Toe WDL          Devices In Places ECG, Blood Pressure Cuff, Pulse Ox, Enriquez, ET Tube, OG/NG, and Central Line      Interventions In Place Heel Mepilex, TAP System, Pillows, Q2 Turns, and Low Air Loss Mattress    Possible Skin Injury Yes    Pictures Uploaded Into Epic Yes  Wound Consult Placed Yes  RN Wound Prevention Protocol Ordered No

## 2023-07-11 NOTE — PROGRESS NOTES
Notified Dr Villeda of critical Lacate of 4.8. Per MD, Nephrology note states to limit fluids so no intervention at this time.   Also discussed ABG results, pH 7.084 Bicarb 9. Per MD administer 2 amps Bicarb and recheck ABG with morning labs.

## 2023-07-11 NOTE — PROGRESS NOTES
Hypoglycemia Intervention    Hypoglycemia protocol intervention:  Blood glucose 57 at 1323.  Intervention: 25 g IV dextrose per MAR   Repeat blood glucose not obtained because patient transitioned to comfort care.  Dr. Shen notified of the above at 1325

## 2023-07-11 NOTE — PROGRESS NOTES
Nephrology Daily Progress Note    Date of Service  7/10/2023    Chief Complaint  62 y.o. female with history of obesity, type 2 diabetes, hypothyroidism, metastatic endometrial cancer usually under the care of [facility] oncology admitted 7/5/2023 with sepsis from urinary source, with course complicated by acute kidney failure, hydronephrosis    Interval Problem Update  7/10-no urine output recorded in the last 24 hours.  Patient went for right nephrostomy tube, then suffered respiratory arrest requiring intubation.  Patient transferred to the ICU.  Patient seen at bedside, intubated and sedated, unable to obtain review of systems.  I had a long discussion with the patient's son and nephew about her current condition and the futility of dialysis.    Review of Systems  Review of Systems   Unable to perform ROS: Intubated        Physical Exam  Temp:  [36.1 °C (97 °F)-36.2 °C (97.2 °F)] 36.1 °C (97 °F)  Pulse:  [] 111  Resp:  [14-36] 21  BP: ()/(43-82) 105/54  SpO2:  [89 %-100 %] 97 %    Physical Exam  Constitutional:       General: She is not in acute distress.     Appearance: She is well-developed. She is ill-appearing.      Interventions: She is sedated and intubated.   HENT:      Mouth/Throat:      Pharynx: Oropharynx is clear. No oropharyngeal exudate.   Eyes:      General: No scleral icterus.        Right eye: No discharge.         Left eye: No discharge.   Neck:      Trachea: No tracheal deviation.   Cardiovascular:      Heart sounds: Normal heart sounds. No murmur heard.  Pulmonary:      Effort: Pulmonary effort is normal. She is intubated.      Breath sounds: No stridor. No rales.   Abdominal:      Palpations: Abdomen is soft.      Tenderness: There is no abdominal tenderness.   Genitourinary:     Comments: Right nephrostomy tube in place with scant bloody output  Musculoskeletal:         General: No deformity.      Right lower leg: Edema (3-4+) present.      Left lower leg: Edema (3-4+) present.    Skin:     General: Skin is warm and dry.   Neurological:      Comments: Unable to obtain due to intubation and sedation   Psychiatric:      Comments: Unable to obtain due to intubation and sedation         Fluids    Intake/Output Summary (Last 24 hours) at 7/10/2023 2135  Last data filed at 7/10/2023 1906  Gross per 24 hour   Intake 1427.39 ml   Output 70 ml   Net 1357.39 ml       Laboratory  Labs reviewed, pertinent labs below.  Recent Labs     07/09/23  0310 07/10/23  0657 07/10/23  1800   WBC 18.6* 17.4* 22.1*   RBC 2.72* 2.80* 3.00*   HEMOGLOBIN 7.4* 7.5* 8.0*   HEMATOCRIT 23.8* 23.9* 26.4*   MCV 87.5 85.4 88.0   MCH 27.2 26.8* 26.7*   MCHC 31.1* 31.4* 30.3*   RDW 68.0* 65.1* 67.5*   PLATELETCT 73* 64* 76*   MPV 11.0 11.8 11.7     Recent Labs     07/09/23  0310 07/10/23  0657 07/10/23  1800   SODIUM 124* 124* 124*   POTASSIUM 5.2 4.8 4.9   CHLORIDE 91* 89* 89*   CO2 12* 11* 10*   GLUCOSE 102* 94 105*   BUN 93* 94* 95*   CREATININE 6.45* 5.80* 6.05*   CALCIUM 8.2* 7.8* 8.1*     Recent Labs     07/10/23  0808 07/10/23  1800   INR 1.95* 2.05*           URINALYSIS:  Lab Results   Component Value Date/Time    COLORURINE Daiana 07/10/2023 1825    CLARITY Cloudy (A) 07/10/2023 1825    SPECGRAVITY 1.025 07/10/2023 1825    PHURINE 5.0 07/10/2023 1825    KETONES Trace (A) 07/10/2023 1825    PROTEINURIN >=300 (A) 07/10/2023 1825    BILIRUBINUR Moderate (A) 07/10/2023 1825    UROBILU 0.2 07/10/2023 1825    NITRITE Negative 07/10/2023 1825    LEUKESTERAS Moderate (A) 07/10/2023 1825    OCCULTBLOOD Large (A) 07/10/2023 1825     [facility]  No results found for: TOTPROTUR No results found for: CREATININEU      Imaging interpreted by radiologist. Imaging reports reviewed with pertinent findings below  DX-ABDOMEN FOR TUBE PLACEMENT   Final Result      NG tube tip projects over the stomach.      DX-CHEST-FOR LINE PLACEMENT Perform procedure in: Patient's Room   Final Result      1.  Well-positioned ETT.   2.  Right IJ central catheter  tip is in the right atrium. It can be pulled back 2-3 cm.   3.  Right suprahilar opacity, likely atelectasis.   4.  No other pulmonary opacities. No pneumothorax or pleural effusions.      OUTSIDE IMAGES-CT HEAD   Final Result      CT-ABDOMEN-PELVIS W/O   Final Result      1.  Large complex right pelvic mass measuring at least 11.8 x 11.8 x 16.9 cm which could be ovarian malignancy however suboptimally evaluated without contrast.   2.  Abdominal and pelvic lymphadenopathy in keeping with chel metastases.   3.  Peritoneal metastases and trace ascites.   4.  Left posterolateral abdominal wall with continuous metastasis.   5.  10 mm lingular pulmonary nodule could be metastatic.   6.  Trace right pleural effusion.   7.  Mild right hydroureteronephrosis likely related to partial obstruction of the right ureter from the right pelvic mass   8.  Hepatosplenomegaly and probable hepatic steatosis.         IR-NEPHROSTOGRAM W/ NEW TUBE PLACEMENT (ALL RADIOLOGY) RIGHT    (Results Pending)   DX-CHEST-PORTABLE (1 VIEW)    (Results Pending)         Current Facility-Administered Medications   Medication Dose Route Frequency Provider Last Rate Last Admin    norepinephrine (Levophed) 8 mg in 250 mL NS infusion (premix)  0-1 mcg/kg/min (Ideal) Intravenous Continuous Rickey Schofield M.D. 19.7 mL/hr at 07/10/23 1906 0.2 mcg/kg/min at 07/10/23 1906    And    vasopressin (Vasostrict) 20 Units in  mL Infusion  0.03 Units/min Intravenous Continuous Rickey Schofield M.D.        Respiratory Therapy Consult   Nebulization Continuous RT Rickey Schofield M.D.        [START ON 7/11/2023] famotidine (Pepcid) tablet 20 mg  20 mg Enteral Tube DAILY Rickey Schofield M.D.        Or    [START ON 7/11/2023] famotidine (Pepcid) injection 20 mg  20 mg Intravenous DAILY Rickey Schofield M.D.        senna-docusate (Pericolace Or Senokot S) 8.6-50 MG per tablet 2 Tablet  2 Tablet Enteral Tube BID Rickey Schofield M.D.        And     polyethylene glycol/lytes (Miralax) PACKET 1 Packet  1 Packet Enteral Tube QDAY PRN Rickey Schofield M.D.        And    magnesium hydroxide (Milk Of Magnesia) suspension 30 mL  30 mL Enteral Tube QDAY PRN Rickey Schofield M.D.        And    bisacodyl (Dulcolax) suppository 10 mg  10 mg Rectal QDAY PRN Rickey Schofield M.D.        lidocaine (Xylocaine) 1 % injection 2 mL  2 mL Tracheal Tube Q30 MIN PRN Rickey Schofield M.D.        Pharmacy Consult: Enteral tube insertion - review meds/change route/product selection  1 Each Other PHARMACY TO DOSE Rickey Schofield M.D.        dexmedetomidine (PRECEDEX) 400 mcg/100mL NS premix infusion  0-1.5 mcg/kg/hr (Ideal) Intravenous Continuous Rickey Schofield M.D. 7.9 mL/hr at 07/10/23 1828 0.6 mcg/kg/hr at 07/10/23 1828    fentaNYL (Sublimaze) injection 25 mcg  25 mcg Intravenous Q HOUR PRN Rickey Schofield M.D.        Or    fentaNYL (Sublimaze) injection 50 mcg  50 mcg Intravenous Q HOUR PRN Rickey Schofield M.D.        Or    fentaNYL (Sublimaze) injection 100 mcg  100 mcg Intravenous Q HOUR PRN Rickey Schofield M.D.   100 mcg at 07/10/23 1756    [START ON 7/11/2023] dronabinol (Marinol) capsule 5 mg  5 mg Enteral Tube BEFORE LUNCH AND DINNER Rickey Schofield M.D.        [START ON 7/11/2023] levothyroxine (Synthroid) tablet 175 mcg  175 mcg Enteral Tube AM ES Rickey Schofield M.D.        [START ON 7/11/2023] midodrine (Proamatine) tablet 10 mg  10 mg Enteral Tube TID WITH MEALS Rickey Schofield M.D.        acetaminophen (Tylenol) tablet 650 mg  650 mg Enteral Tube Q6HRS PRN Rickey Schofield M.D.        fentaNYL (SUBLIMAZE) 50 mcg/mL in 50mL (Continuous Infusion)   Intravenous Continuous Rickey Schofield M.D.        insulin regular (HumuLIN R,NovoLIN R) injection  1-6 Units Subcutaneous Q6HRS Emelyn Perez, A.P.R.N.        And    dextrose 10 % BOLUS 25 g  25 g Intravenous Q15 MIN PRN Emelyn Perez A.P.R.N.        cefTRIAXone (Rocephin) syringe 2,000 mg   2,000 mg Intravenous Q EVENING Eb Dominguez M.D.   2,000 mg at 07/10/23 1959    labetalol (Normodyne/Trandate) injection 10 mg  10 mg Intravenous Q4HRS PRN John Milligan M.D.             Assessment/Plan  62 y.o. female with history of obesity, type 2 diabetes, hypothyroidism, metastatic endometrial cancer usually under the care of [facility] oncology admitted 7/5/2023 with sepsis from urinary source, with course complicated by acute kidney failure, hydronephrosis    1.  KAREN on CKD of undetermined stage.  Patient had a creatinine of 3.4 in April 2023.  Given her metastatic endometrial cancer, shock requiring vasopressors, respiratory failure requiring intubation, I believe dialysis in this patient would be futile, and I do not recommend dialysis treatment.  I recommend transitioning to comfort focused care.  Patient's son wishes to wait another 12 to 24 hours to see if she improves with percutaneous nephrostomy tube.    2.  Right hydronephrosis, noted on imaging.  Status post percutaneous nephrostomy tube placement 7/10/2023.  Monitor urine output.    3.  Metabolic acidosis, with lactic acidosis.  Due to advanced kidney failure.  The patient cannot tolerate bicarbonate infusion as she is already massively volume overloaded.  I recommend checking VBG and if pH is less than 7.2, I would recommend giving 1 to 2 A of bicarb as IV push.  Check renal function panel daily while within goals of care.    4.  Metastatic endometrial cancer, metastatic to peritoneal carcinomatosis.  I discussed with the patient's son that she has incurable disease, he was under the impression that her cancer could be cured.  Patient's chemotherapy is currently on hold, which is reasonable given her multiorgan failure.    5.  Acute hypoxic respiratory failure, intubated 7/10/2023.  Given her shock requiring vasopressors, I do not believe she would tolerate dialysis or diuretics.  I recommend transition to comfort focused measures.  Patient's son  wishes to wait 12 to 24 hours to assess her progress.    6.  Severe sepsis with shock requiring vasopressors.  Source unclear.   Recommend maintain vasopressors as needed to maintain mean arterial pressure greater than 65.    7.  Hyponatremia, likely due to excess ADH from abdominal metastases.  Limit hypotonic fluids.  Do not order salt tabs.  Check serum sodium daily.      8.  Goals of care.  I had a long discussion with the patient's son, Kiran.  I recommended transition to comfort focused measures.    I spent greater than or equal to 50 minutes of care time for this patient, including direct face-to-face contact with the patient/family, coordinating care with the patient's primary team.  Prognosis is grim. I recommend transition to comfort care / hospice.   Discussed with Dr. Rickey Larson MD  Nephrology  [facility]

## 2023-07-11 NOTE — PROGRESS NOTES
Critical Care Progress Note    Date of admission  7/5/2023    Chief Complaint  62 y.o. female admitted 7/5/2023 with septic shock due to an acute urinary infection with acute on chronic kidney injury.  She has a history of stage IV endometrial carcinoma, DM 2, dyslipidemia, hypothyroidism and CKD.    Hospital Course      7/11 -    vent day 2.  Titrating norepinephrine.  Renal function is worse with very low urine output.      Interval Problem Update  Reviewed last 24 hour events:      ST  Fent  NE  Vaso  Dex  98.6  Very low urine output  +1869 mL in the last 24      Review of Systems  Review of Systems   Unable to perform ROS: Acuity of condition        Vital Signs for last 24 hours   Temp:  [34.2 °C (93.6 °F)-36.1 °C (97 °F)] 36.1 °C (97 °F)  Pulse:  [] 122  Resp:  [7-36] 14  BP: ()/(43-82) 104/54  SpO2:  [89 %-100 %] 98 %    Hemodynamic parameters for last 24 hours       Respiratory Information for the last 24 hours  Vent Mode: APVCMV  Rate (breaths/min): 20  Vt Target (mL): 350  PEEP/CPAP: 8  MAP: 9.9  Control VTE (exp VT): 439    Physical Exam   Physical Exam  Constitutional:       Comments: On ventilator   HENT:      Head: Normocephalic.      Mouth/Throat:      Pharynx: Oropharynx is clear.   Eyes:      Pupils: Pupils are equal, round, and reactive to light.   Cardiovascular:      Comments: Sinus tachycardia  Pulmonary:      Breath sounds: Rales (Few crackles) present. No wheezing.   Abdominal:      General: There is no distension.      Tenderness: There is no abdominal tenderness.   Musculoskeletal:      Right lower leg: Edema present.      Left lower leg: Edema present.   Skin:     General: Skin is warm.      Capillary Refill: Capillary refill takes less than 2 seconds.   Neurological:      Comments: Sedated         Medications  Current Facility-Administered Medications   Medication Dose Route Frequency Provider Last Rate Last Admin    insulin regular (HumuLIN R,NovoLIN R) injection  1-6 Units  Subcutaneous Q6HRS Maxx Hale M.D.        And    dextrose 10 % BOLUS 25 g  25 g Intravenous Q15 MIN PRN Maxx Hale M.D. 999 mL/hr at 07/11/23 0547 25 g at 07/11/23 0547    HYDROmorphone (DILAUDID) 0.2 mg/mL in 50mL NS (Continuous Infusion)   Intravenous Continuous Maxx Hale M.D.        HYDROmorphone (Dilaudid) injection 0.5-2 mg  0.5-2 mg Intravenous Q HOUR PRN Maxx Hale M.D.        norepinephrine (Levophed) 8 mg in 250 mL NS infusion (premix)  0-1 mcg/kg/min (Ideal) Intravenous Continuous Rickey Schofield M.D. 49.1 mL/hr at 07/11/23 0337 0.5 mcg/kg/min at 07/11/23 0337    And    vasopressin (Vasostrict) 20 Units in  mL Infusion  0.03 Units/min Intravenous Continuous Rickey Schofield M.D. 9 mL/hr at 07/11/23 0505 0.03 Units/min at 07/11/23 0505    Respiratory Therapy Consult   Nebulization Continuous RT Rickey Schofield M.D.        famotidine (Pepcid) tablet 20 mg  20 mg Enteral Tube DAILY Rickey Schofield M.D.        Or    famotidine (Pepcid) injection 20 mg  20 mg Intravenous DAILY Rickey Schofield M.D.   20 mg at 07/11/23 0549    senna-docusate (Pericolace Or Senokot S) 8.6-50 MG per tablet 2 Tablet  2 Tablet Enteral Tube BID Rickey Schofield M.D.   2 Tablet at 07/11/23 0550    And    polyethylene glycol/lytes (Miralax) PACKET 1 Packet  1 Packet Enteral Tube QDAY PRN Rickey Schofield M.D.        And    magnesium hydroxide (Milk Of Magnesia) suspension 30 mL  30 mL Enteral Tube QDAY PRN Rickey Schofield M.D.        And    bisacodyl (Dulcolax) suppository 10 mg  10 mg Rectal QDAY PRN Rickey Schofield M.D.        lidocaine (Xylocaine) 1 % injection 2 mL  2 mL Tracheal Tube Q30 MIN PRN Rickey Schofield M.D.        Pharmacy Consult: Enteral tube insertion - review meds/change route/product selection  1 Each Other PHARMACY TO DOSE Rickey Schofield M.D.        dexmedetomidine (PRECEDEX) 400 mcg/100mL NS premix infusion  0-1.5 mcg/kg/hr (Ideal)  Intravenous Continuous Rickey Schofield M.D. 6.6 mL/hr at 07/11/23 0610 0.5 mcg/kg/hr at 07/11/23 0610    levothyroxine (Synthroid) tablet 175 mcg  175 mcg Enteral Tube AM ES Rickey Schofield M.D.   175 mcg at 07/11/23 0550    acetaminophen (Tylenol) tablet 650 mg  650 mg Enteral Tube Q6HRS PRN Rickey Schofield M.D.        cefTRIAXone (Rocephin) syringe 2,000 mg  2,000 mg Intravenous Q EVENING Eb Dominguez M.D.   2,000 mg at 07/10/23 1959    labetalol (Normodyne/Trandate) injection 10 mg  10 mg Intravenous Q4HRS PRN John Milligan M.D.           Fluids    Intake/Output Summary (Last 24 hours) at 7/11/2023 0651  Last data filed at 7/11/2023 0600  Gross per 24 hour   Intake 1949.16 ml   Output 80 ml   Net 1869.16 ml       Laboratory  Recent Labs     07/10/23  1951 07/11/23  0207   ISTATAPH 7.084* 7.092*   ISTATAPCO2 30.8 34.6   ISTATAPO2 140* 95*   ISTATATCO2 10* 12*   DPABQBV9ELO 98 94   ISTATARTHCO3 9.2* 10.6*   ISTATARTBE -19* -18*   ISTATTEMP see below 35.1 C   ISTATFIO2 60 40   ISTATSPEC Arterial Arterial   ISTATAPHTC  --  7.117*   AEDFPXHD1EX  --  85         Recent Labs     07/10/23  0657 07/10/23  1800 07/11/23  0220   SODIUM 124* 124* 127*   POTASSIUM 4.8 4.9 5.6*   CHLORIDE 89* 89* 89*   CO2 11* 10* 10*   BUN 94* 95* 97*   CREATININE 5.80* 6.05* 6.49*   MAGNESIUM  --   --  2.3   PHOSPHORUS  --   --  9.4*   CALCIUM 7.8* 8.1* 8.1*     Recent Labs     07/10/23  0657 07/10/23  1800 07/11/23  0220   ALTSGPT 57* 65* 91*   ASTSGOT 397* 478* 771*   ALKPHOSPHAT 806* 983* 984*   TBILIRUBIN 1.5 1.6* 1.8*   PREALBUMIN  --  3.4* <3.0*   GLUCOSE 94 105* 100*     Recent Labs     07/10/23  0657 07/10/23  1800 07/11/23  0220   WBC 17.4* 22.1* 21.7*   NEUTSPOLYS  --  91.60* 94.90*   LYMPHOCYTES  --  3.40* 3.40*   MONOCYTES  --  1.70 0.80   EOSINOPHILS  --  0.00 0.00   BASOPHILS  --  0.00 0.00   ASTSGOT 397* 478* 771*   ALTSGPT 57* 65* 91*   ALKPHOSPHAT 806* 983* 984*   TBILIRUBIN 1.5 1.6* 1.8*     Recent Labs      07/10/23  0657 07/10/23  0808 07/10/23  1800 07/11/23  0220   RBC 2.80*  --  3.00* 3.04*   HEMOGLOBIN 7.5*  --  8.0* 8.2*   HEMATOCRIT 23.9*  --  26.4* 27.1*   PLATELETCT 64*  --  76* 77*   PROTHROMBTM  --  21.7* 22.5*  --    INR  --  1.95* 2.05*  --        Imaging  X-Ray:  I have personally reviewed the images and compared with prior images. and My impression is: Right upper lobe atelectasis    Assessment/Plan  * Septic shock (HCC)- (present on admission)  Assessment & Plan  Present on admission  Suspect urinary source  Continue ceftriaxone  I am titrating norepinephrine to keep mean arterial pressure greater than 65    Acute respiratory failure with hypoxia and hypercapnia (HCC)  Assessment & Plan  Intubated 7/10  ABCDEF bundle  Not a candidate for SAT/SBT  Mobility level 1 for now    Encephalopathy acute- (present on admission)  Assessment & Plan  Acute metabolic encephalopathy and acute septic encephalopathy  Limit sedatives and mind altering medications is much as possible  Follow neuro exam    Acute-on-chronic kidney injury (HCC)- (present on admission)  Assessment & Plan  CT with mild right hydroureteonephrosis likely related to partial obstruction from carcinoma  S/P right percutaneous nephrostomy on 7/10 complicated by perforation of renal collecting system and near respiratory arrest requiring emergent intubation  Very low urine output  Renal function is worse  Not a candidate for hemodialysis in my opinion given her overall poor prognosis  Monitor renal function and urine output  Avoid nephrotoxins and renal dose medications    Endometrial carcinoma (HCC)- (present on admission)  Assessment & Plan  Stage IV endometrial carcinoma  CT confirms large right pelvic mass with abdominal and pelvic lymphadenopathy, peritoneal metastases and possible hepatic metastases  Treated at [facility] with neoadjuvant chemotherapy, surgical resection, adjuvant chemotherapy, salvage chemotherapy and radiation to her vaginal  cuff  She has had progression of disease despite aggressive therapy    Type 2 diabetes mellitus (HCC)- (present on admission)  Assessment & Plan  Sliding scale insulin    HLD (hyperlipidemia)- (present on admission)  Assessment & Plan  Hold statin with increasing aminotransferases         VTE:  Contraindicated  Ulcer: H2 Antagonist  Lines: Central Line  Ongoing indication addressed and Enriquez Catheter  Ongoing indication addressed    I have performed a physical exam and reviewed and updated ROS and Plan today (7/11/2023). In review of yesterday's note (7/10/2023), there are no changes except as documented above.     I have assessed and reassessed her respiratory status with ventilator adjustments, airway mechanics, ventilator waveforms, blood pressure, hemodynamics, cardiovascular status with titration of norepinephrine, urine output and her neurologic status.  She is at high risk for worsening respiratory, cardiovascular and renal system dysfunction.    Discussed patient condition and risk of morbidity and/or mortality with RN, RT, Pharmacy, Charge nurse / hot rounds, and QA team    The patient remains critically ill.  Critical care time = 105 minutes in directly providing and coordinating critical care and extensive data review.  No time overlap and excludes procedures.    Maxx Hale MD  Pulmonary and Critical Care Medicine

## 2023-07-11 NOTE — PROGRESS NOTES
Report received from Yusra SUMMERS, patient on levo at 0.15 at actual body weight, and precedex at 0.6

## 2023-07-11 NOTE — ASSESSMENT & PLAN NOTE
I had a long discussion with patient's son and nephew and discussed the current clinical status, the circumstances leading to intubation etc.  Patient's son was in the waiting room when her condition deteriorated and the CODE BLUE was called.  The interventional radiologist discussed intubation with the patient's son and it was decided to proceed with intubation.  Patient is currently intubated and I discussed with the son the goals of care at this point.  I explained to the son that we need to consider proceeding with options that align with her wishes.  I also explained to the patient's son that patient did not want to pursue resuscitation and intubation and patients nephrologist do not think that hemodialysis is in her best interest.  Patient's son endorsed understanding and will discuss with the family.

## 2023-07-11 NOTE — DISCHARGE PLANNING
Case Management Discharge Planning    Admission Date: 7/5/2023  GMLOS: 5  ALOS: 6    6-Clicks ADL Score:    6-Clicks Mobility Score: 6      Anticipated Discharge Dispo: Discharge Disposition: Discharged to home/self care (01)      Action(s) Taken: RNCM received notification that patient had been accepted to [facility]. However, patient had a cardiac arrest 7/10. Patient and family have decided on comfort care. HCM will follow as needed.

## 2023-07-11 NOTE — PROGRESS NOTES
RN at bedside with Nephrologist who suggests Fentanyl drip for patient comfort. Per SHEA Gonzalez to order Fentanyl drip.

## 2023-07-11 NOTE — CONSULTS
Critical Care Consultation    Date of consult: 7/10/2023    Referring Physician  Eb Dominguez M.D.    Reason for Consultation  Code Blue in IR.     History of Presenting Illness  62 y.o. female who presented 7/5/2023 with past medical history significant for type 2 diabetes mellitus, dyslipidemia, hypothyroidism, chronic kidney disease and advanced stage IV endometrial cancer who was previously treated at [place] [facility] and presented with sepsis likely from urinary source complicated by acute renal failure and altered mentation.  She was transferred from emergency department in [facility] in [place].  At the emergency department patient was noted to have significant hyponatremia, acute kidney injury with creatinine elevation to 5.1, urinalysis showing positive nitrates and significant leukocyte Estrase, significant anion gap metabolic acidosis and leukocytosis.  She had a history of ESBL  tract infections and hence was a started on ertapenem and vancomycin and was transferred for a higher level of care to [facility].  Patient was admitted on 7/5/2023.  During her admission she underwent a CT scan that showed a large pelvic mass (likely endometrial cancer) with right sided hydronephrosis secondary to ureteral obstruction.  There were also widespread metastatic deposits in the peritoneum and lungs possibly.  Since the CT scan most likely showed an advanced cancer discussions were started with patient's family regarding goals of care including options of hospice and comfort focused care.  Patient's family would like to continue all medical treatment and chemotherapy in the future and a plan was made to relieve the ureteral obstruction with the placement of a percutaneous nephrostomy tube on the right side.  Patient had right-sided percutaneous nephrostomy tube placed and towards the end of the procedure a CODE BLUE was called.    I attended the CODE BLUE.  No chest compressions  were in progress.  I was told that patient never lost her pulse and she started to have agonal breathing towards the end of the procedure when a CODE BLUE was called.  Patient was having bag mask ventilation when I attended. The interventional radiologist was talking to her son to explain the current situation and discussed options. Patient's family's decision was proceed with intubation if needed.    Airway: Not maintaining.  Breathing: Agonal breathing efforts.  Full breath sounds.  Oxygen saturations 99%  Circulation: Blood pressure 90 systolic.  Patient has right-sided port in situ.  Disability: E1 V1 M2.    Plan was made to proceed with intubation.  RSI was performed.  Patient was then intubated successfully on first attempt.  She required norepinephrine drip started prior to intubation to offset the document in the preload.  Her condition was stabilized and she was transferred from interventional radiology suite to [facility].    Code Status  DNAR/DNI    Review of Systems  Review of Systems   Unable to perform ROS: Acuity of condition (Critical care)       Past Medical History   has no past medical history on file.    Surgical History   has no past surgical history on file.    Family History  family history is not on file.    Social History       Medications  Home Medications       Reviewed by Spencer Mckinney (Pharmacy Tech) on 07/05/23 at 1452  Med List Status: Complete     Medication Last Dose Status   cephALEXin (KEFLEX) 500 MG Cap UNK Active   Fidaxomicin (DIFICID) 200 MG Tab UNK Active   letrozole (FEMARA) 2.5 MG Tab UNK Active   levothyroxine (SYNTHROID) 175 MCG Tab UNK Active   potassium Chloride ER (K-TAB) 20 MEQ Tab CR tablet UNK Active   Probiotic Pack UNK Active   rosuvastatin (CRESTOR) 40 MG tablet UNK Active                  Current Facility-Administered Medications   Medication Dose Route Frequency Provider Last Rate Last Admin    norepinephrine (Levophed) 8 mg in 250 mL NS infusion (premix)  0-1  mcg/kg/min (Ideal) Intravenous Continuous Rickey Schofield M.D. 19.7 mL/hr at 07/10/23 1906 0.2 mcg/kg/min at 07/10/23 1906    And    vasopressin (Vasostrict) 20 Units in  mL Infusion  0.03 Units/min Intravenous Continuous Rickey Schofield M.D.        Respiratory Therapy Consult   Nebulization Continuous RT Rickey Schofield M.D.        [START ON 7/11/2023] famotidine (Pepcid) tablet 20 mg  20 mg Enteral Tube DAILY Rickey Schofield M.D.        Or    [START ON 7/11/2023] famotidine (Pepcid) injection 20 mg  20 mg Intravenous DAILY Rickey Schofield M.D.        senna-docusate (Pericolace Or Senokot S) 8.6-50 MG per tablet 2 Tablet  2 Tablet Enteral Tube BID Rickey Schofield M.D.        And    polyethylene glycol/lytes (Miralax) PACKET 1 Packet  1 Packet Enteral Tube QDAY PRN Rickey Schofield M.D.        And    magnesium hydroxide (Milk Of Magnesia) suspension 30 mL  30 mL Enteral Tube QDAY PRN Rickey Schofield M.D.        And    bisacodyl (Dulcolax) suppository 10 mg  10 mg Rectal QDAY PRN Rickey Schofield M.D.        lidocaine (Xylocaine) 1 % injection 2 mL  2 mL Tracheal Tube Q30 MIN PRN Rickey Schofield M.D.        Pharmacy Consult: Enteral tube insertion - review meds/change route/product selection  1 Each Other PHARMACY TO DOSE Rickey Schofield M.D.        dexmedetomidine (PRECEDEX) 400 mcg/100mL NS premix infusion  0-1.5 mcg/kg/hr (Ideal) Intravenous Continuous Rickey Schofield M.D. 7.9 mL/hr at 07/10/23 1828 0.6 mcg/kg/hr at 07/10/23 1828    fentaNYL (Sublimaze) injection 25 mcg  25 mcg Intravenous Q HOUR PRN Rickey Schofield M.D.        Or    fentaNYL (Sublimaze) injection 50 mcg  50 mcg Intravenous Q HOUR PRN Rickey Schofield M.D.        Or    fentaNYL (Sublimaze) injection 100 mcg  100 mcg Intravenous Q HOUR PRN Rickey Schofield M.D.   100 mcg at 07/10/23 1756    [START ON 7/11/2023] dronabinol (Marinol) capsule 5 mg  5 mg Enteral Tube BEFORE LUNCH AND DINNER Rickey HUBER  FORTINO Schofield        [START ON 7/11/2023] levothyroxine (Synthroid) tablet 175 mcg  175 mcg Enteral Tube AM ES Rickey Schofield M.D.        [START ON 7/11/2023] midodrine (Proamatine) tablet 10 mg  10 mg Enteral Tube TID WITH MEALS Rickey Schofield M.D.        acetaminophen (Tylenol) tablet 650 mg  650 mg Enteral Tube Q6HRS PRN Rcikey Schofield M.D.        fentaNYL (SUBLIMAZE) 50 mcg/mL in 50mL (Continuous Infusion)   Intravenous Continuous Rickey Schofield M.D.        insulin regular (HumuLIN R,NovoLIN R) injection  1-6 Units Subcutaneous Q6HRS Emelyn Perez, A.P.R.NAriana        And    dextrose 10 % BOLUS 25 g  25 g Intravenous Q15 MIN PRN Emelyn Perez, A.P.R.NAriana        cefTRIAXone (Rocephin) syringe 2,000 mg  2,000 mg Intravenous Q EVENING Eb Dominguez M.D.   2,000 mg at 07/10/23 1959    labetalol (Normodyne/Trandate) injection 10 mg  10 mg Intravenous Q4HRS PRN John Milligan M.D.           Allergies  No Known Allergies    Vital Signs last 24 hours  Temp:  [36.1 °C (97 °F)-36.2 °C (97.2 °F)] 36.1 °C (97 °F)  Pulse:  [] 111  Resp:  [14-36] 21  BP: ()/(43-82) 105/54  SpO2:  [89 %-100 %] 97 %    Physical Exam  Physical Exam  Constitutional:       Appearance: She is obese.      Comments: Unresponsive, agonal breathing efforts   HENT:      Head: Normocephalic and atraumatic.      Mouth/Throat:      Mouth: Mucous membranes are moist.   Eyes:      Pupils: Pupils are equal, round, and reactive to light.   Cardiovascular:      Rate and Rhythm: Tachycardia present.   Pulmonary:      Comments: Poor respiratory efforts  Decreased breath sounds bilaterally at the bases  Occasional crackles  Abdominal:      General: Abdomen is flat.   Skin:     Capillary Refill: Capillary refill takes more than 3 seconds.         Fluids    Intake/Output Summary (Last 24 hours) at 7/10/2023 2117  Last data filed at 7/10/2023 1906  Gross per 24 hour   Intake 1427.39 ml   Output 70 ml   Net 1357.39 ml       Laboratory  Recent  Results (from the past 48 hour(s))   POCT glucose device results    Collection Time: 07/08/23  9:30 PM   Result Value Ref Range    POC Glucose, Blood 76 65 - 99 mg/dL   POCT glucose device results    Collection Time: 07/08/23 10:46 PM   Result Value Ref Range    POC Glucose, Blood 89 65 - 99 mg/dL   OSMOLALITY SERUM    Collection Time: 07/08/23 11:30 PM   Result Value Ref Range    Osmolality Serum 296 278 - 298 mOsm/kg H2O   proBrain Natriuretic Peptide, NT    Collection Time: 07/08/23 11:30 PM   Result Value Ref Range    NT-proBNP 4664 (H) 0 - 125 pg/mL   CORTISOL    Collection Time: 07/08/23 11:30 PM   Result Value Ref Range    Cortisol 20.2 0.0 - 23.0 ug/dL   CBC WITHOUT DIFFERENTIAL    Collection Time: 07/09/23  3:10 AM   Result Value Ref Range    WBC 18.6 (H) 4.8 - 10.8 K/uL    RBC 2.72 (L) 4.20 - 5.40 M/uL    Hemoglobin 7.4 (L) 12.0 - 16.0 g/dL    Hematocrit 23.8 (L) 37.0 - 47.0 %    MCV 87.5 81.4 - 97.8 fL    MCH 27.2 27.0 - 33.0 pg    MCHC 31.1 (L) 32.2 - 35.5 g/dL    RDW 68.0 (H) 35.9 - 50.0 fL    Platelet Count 73 (L) 164 - 446 K/uL    MPV 11.0 9.0 - 12.9 fL   Comp Metabolic Panel    Collection Time: 07/09/23  3:10 AM   Result Value Ref Range    Sodium 124 (L) 135 - 145 mmol/L    Potassium 5.2 3.6 - 5.5 mmol/L    Chloride 91 (L) 96 - 112 mmol/L    Co2 12 (L) 20 - 33 mmol/L    Anion Gap 21.0 (H) 7.0 - 16.0    Glucose 102 (H) 65 - 99 mg/dL    Bun 93 (H) 8 - 22 mg/dL    Creatinine 6.45 (HH) 0.50 - 1.40 mg/dL    Calcium 8.2 (L) 8.5 - 10.5 mg/dL    AST(SGOT) 388 (H) 12 - 45 U/L    ALT(SGPT) 61 (H) 2 - 50 U/L    Alkaline Phosphatase 742 (H) 30 - 99 U/L    Total Bilirubin 1.4 0.1 - 1.5 mg/dL    Albumin 1.9 (L) 3.2 - 4.9 g/dL    Total Protein 6.3 6.0 - 8.2 g/dL    Globulin 4.4 (H) 1.9 - 3.5 g/dL    A-G Ratio 0.4 g/dL   IMMATURE PLT FRACTION    Collection Time: 07/09/23  3:10 AM   Result Value Ref Range    Imm. Plt Fraction 8.9 0.6 - 13.1 %   CORRECTED CALCIUM    Collection Time: 07/09/23  3:10 AM   Result Value  Ref Range    Correct Calcium 9.9 8.5 - 10.5 mg/dL   ESTIMATED GFR    Collection Time: 07/09/23  3:10 AM   Result Value Ref Range    GFR (CKD-EPI) 7 (A) >60 mL/min/1.73 m 2   POCT glucose device results    Collection Time: 07/09/23  5:50 AM   Result Value Ref Range    POC Glucose, Blood 100 (H) 65 - 99 mg/dL   POCT glucose device results    Collection Time: 07/09/23 12:16 PM   Result Value Ref Range    POC Glucose, Blood 109 (H) 65 - 99 mg/dL   POCT glucose device results    Collection Time: 07/09/23  9:26 PM   Result Value Ref Range    POC Glucose, Blood 109 (H) 65 - 99 mg/dL   POCT glucose device results    Collection Time: 07/10/23 12:17 AM   Result Value Ref Range    POC Glucose, Blood 102 (H) 65 - 99 mg/dL   CBC WITHOUT DIFFERENTIAL    Collection Time: 07/10/23  6:57 AM   Result Value Ref Range    WBC 17.4 (H) 4.8 - 10.8 K/uL    RBC 2.80 (L) 4.20 - 5.40 M/uL    Hemoglobin 7.5 (L) 12.0 - 16.0 g/dL    Hematocrit 23.9 (L) 37.0 - 47.0 %    MCV 85.4 81.4 - 97.8 fL    MCH 26.8 (L) 27.0 - 33.0 pg    MCHC 31.4 (L) 32.2 - 35.5 g/dL    RDW 65.1 (H) 35.9 - 50.0 fL    Platelet Count 64 (L) 164 - 446 K/uL    MPV 11.8 9.0 - 12.9 fL   Comp Metabolic Panel    Collection Time: 07/10/23  6:57 AM   Result Value Ref Range    Sodium 124 (L) 135 - 145 mmol/L    Potassium 4.8 3.6 - 5.5 mmol/L    Chloride 89 (L) 96 - 112 mmol/L    Co2 11 (L) 20 - 33 mmol/L    Anion Gap 24.0 (H) 7.0 - 16.0    Glucose 94 65 - 99 mg/dL    Bun 94 (H) 8 - 22 mg/dL    Creatinine 5.80 (HH) 0.50 - 1.40 mg/dL    Calcium 7.8 (L) 8.5 - 10.5 mg/dL    AST(SGOT) 397 (H) 12 - 45 U/L    ALT(SGPT) 57 (H) 2 - 50 U/L    Alkaline Phosphatase 806 (H) 30 - 99 U/L    Total Bilirubin 1.5 0.1 - 1.5 mg/dL    Albumin 1.7 (L) 3.2 - 4.9 g/dL    Total Protein 6.1 6.0 - 8.2 g/dL    Globulin 4.4 (H) 1.9 - 3.5 g/dL    A-G Ratio 0.4 g/dL   IMMATURE PLT FRACTION    Collection Time: 07/10/23  6:57 AM   Result Value Ref Range    Imm. Plt Fraction 9.1 0.6 - 13.1 %   ESTIMATED GFR     Collection Time: 07/10/23  6:57 AM   Result Value Ref Range    GFR (CKD-EPI) 8 (A) >60 mL/min/1.73 m 2   CORRECTED CALCIUM    Collection Time: 07/10/23  6:57 AM   Result Value Ref Range    Correct Calcium 9.6 8.5 - 10.5 mg/dL   POCT glucose device results    Collection Time: 07/10/23  8:03 AM   Result Value Ref Range    POC Glucose, Blood 87 65 - 99 mg/dL   Prothrombin Time    Collection Time: 07/10/23  8:08 AM   Result Value Ref Range    PT 21.7 (H) 12.0 - 14.6 sec    INR 1.95 (H) 0.87 - 1.13   POCT glucose device results    Collection Time: 07/10/23 11:56 AM   Result Value Ref Range    POC Glucose, Blood 87 65 - 99 mg/dL   BASIC TEG    Collection Time: 07/10/23 12:28 PM   Result Value Ref Range    Reaction Time Initial-R 10.2 (H) 4.6 - 9.1 min    React Time Initial Hep 9.7 (H) 4.3 - 8.3 min    Clot Kinetics-K 1.1 0.8 - 2.1 min    Clot Angle-Angle 75.6 63.0 - 78.0 degrees    Maximum Clot Strength-MA 68.7 52.0 - 69.0 mm    TEG Functional Fibrinogen(MA) 42.1 (H) 15.0 - 32.0 mm    TEG Algorithm Link Algorithm    EKG    Collection Time: 07/10/23  5:39 PM   Result Value Ref Range    Report       [facility]    Test Date:  2023-07-10  Pt Name:    STEPHANE KELLY                Department: 161  MRN:        [id number]                      Room:       16  Gender:     Female                       Technician: [facility]  :        1960                   Requested By:PAULINE SANDOVAL  Order #:    [id number]                    Reading MD: Bernardo Su MD    Measurements  Intervals                                Axis  Rate:       127                          P:          52  SD:         133                          QRS:        77  QRSD:       105                          T:          7  QT:         325  QTc:        473    Interpretive Statements  Sinus tachycardia  Low voltage, extremity and precordial leads  No previous ECG available for comparison  Electronically Signed On 07- 18:57:27 PDT by Bernardo Su MD     Lactic  Acid -STAT Once    Collection Time: 07/10/23  6:00 PM   Result Value Ref Range    Lactic Acid 4.0 (HH) 0.5 - 2.0 mmol/L   CBC with Differential    Collection Time: 07/10/23  6:00 PM   Result Value Ref Range    WBC 22.1 (H) 4.8 - 10.8 K/uL    RBC 3.00 (L) 4.20 - 5.40 M/uL    Hemoglobin 8.0 (L) 12.0 - 16.0 g/dL    Hematocrit 26.4 (L) 37.0 - 47.0 %    MCV 88.0 81.4 - 97.8 fL    MCH 26.7 (L) 27.0 - 33.0 pg    MCHC 30.3 (L) 32.2 - 35.5 g/dL    RDW 67.5 (H) 35.9 - 50.0 fL    Platelet Count 76 (L) 164 - 446 K/uL    MPV 11.7 9.0 - 12.9 fL    Neutrophils-Polys 91.60 (H) 44.00 - 72.00 %    Lymphocytes 3.40 (L) 22.00 - 41.00 %    Monocytes 1.70 0.00 - 13.40 %    Eosinophils 0.00 0.00 - 6.90 %    Basophils 0.00 0.00 - 1.80 %    Nucleated RBC 4.60 (H) 0.00 - 0.20 /100 WBC    Neutrophils (Absolute) 20.24 (H) 1.82 - 7.42 K/uL    Lymphs (Absolute) 0.75 (L) 1.00 - 4.80 K/uL    Monos (Absolute) 0.38 0.00 - 0.85 K/uL    Eos (Absolute) 0.00 0.00 - 0.51 K/uL    Baso (Absolute) 0.00 0.00 - 0.12 K/uL    NRBC (Absolute) 1.02 K/uL    Anisocytosis 1+     Macrocytosis 1+    Comp Metabolic Panel    Collection Time: 07/10/23  6:00 PM   Result Value Ref Range    Sodium 124 (L) 135 - 145 mmol/L    Potassium 4.9 3.6 - 5.5 mmol/L    Chloride 89 (L) 96 - 112 mmol/L    Co2 10 (L) 20 - 33 mmol/L    Anion Gap 25.0 (H) 7.0 - 16.0    Glucose 105 (H) 65 - 99 mg/dL    Bun 95 (H) 8 - 22 mg/dL    Creatinine 6.05 (HH) 0.50 - 1.40 mg/dL    Calcium 8.1 (L) 8.5 - 10.5 mg/dL    AST(SGOT) 478 (H) 12 - 45 U/L    ALT(SGPT) 65 (H) 2 - 50 U/L    Alkaline Phosphatase 983 (H) 30 - 99 U/L    Total Bilirubin 1.6 (H) 0.1 - 1.5 mg/dL    Albumin 2.0 (L) 3.2 - 4.9 g/dL    Total Protein 6.4 6.0 - 8.2 g/dL    Globulin 4.4 (H) 1.9 - 3.5 g/dL    A-G Ratio 0.5 g/dL   Fibrinogen    Collection Time: 07/10/23  6:00 PM   Result Value Ref Range    Fibrinogen 674 (H) 215 - 460 mg/dL   CRP QUANTITIVE (NON-CARDIAC)    Collection Time: 07/10/23  6:00 PM   Result Value Ref Range    Stat  C-Reactive Protein 35.18 (H) 0.00 - 0.75 mg/dL   PREALBUMIN    Collection Time: 07/10/23  6:00 PM   Result Value Ref Range    Pre-Albumin 3.4 (L) 18.0 - 38.0 mg/dL   CORTISOL    Collection Time: 07/10/23  6:00 PM   Result Value Ref Range    Cortisol 25.7 (H) 0.0 - 23.0 ug/dL   PROCALCITONIN    Collection Time: 07/10/23  6:00 PM   Result Value Ref Range    Procalcitonin 30.00 (HH) <0.25 ng/mL   Prothrombin Time    Collection Time: 07/10/23  6:00 PM   Result Value Ref Range    PT 22.5 (H) 12.0 - 14.6 sec    INR 2.05 (H) 0.87 - 1.13   IMMATURE PLT FRACTION    Collection Time: 07/10/23  6:00 PM   Result Value Ref Range    Imm. Plt Fraction 11.3 0.6 - 13.1 %   DIFFERENTIAL MANUAL    Collection Time: 07/10/23  6:00 PM   Result Value Ref Range    Metamyelocytes 2.50 %    Myelocytes 0.80 %    Manual Diff Status PERFORMED     Comment See Comment    PERIPHERAL SMEAR REVIEW    Collection Time: 07/10/23  6:00 PM   Result Value Ref Range    Peripheral Smear Review see below    PLATELET ESTIMATE    Collection Time: 07/10/23  6:00 PM   Result Value Ref Range    Plt Estimation Decreased    MORPHOLOGY    Collection Time: 07/10/23  6:00 PM   Result Value Ref Range    RBC Morphology Present     Polychromia 1+     Poikilocytosis 2+     Schistocytes 1+     Echinocytes 2+     Rouleaux Few    CORRECTED CALCIUM    Collection Time: 07/10/23  6:00 PM   Result Value Ref Range    Correct Calcium 9.7 8.5 - 10.5 mg/dL   ESTIMATED GFR    Collection Time: 07/10/23  6:00 PM   Result Value Ref Range    GFR (CKD-EPI) 7 (A) >60 mL/min/1.73 m 2   URINALYSIS    Collection Time: 07/10/23  6:25 PM    Specimen: Urine, Enriquez Cath   Result Value Ref Range    Color Daiana     Character Cloudy (A)     Specific Gravity 1.025 <1.035    Ph 5.0 5.0 - 8.0    Glucose Negative Negative mg/dL    Ketones Trace (A) Negative mg/dL    Protein >=300 (A) Negative mg/dL    Bilirubin Moderate (A) Negative    Urobilinogen, Urine 0.2 Negative    Nitrite Negative Negative     Leukocyte Esterase Moderate (A) Negative    Occult Blood Large (A) Negative    Micro Urine Req Microscopic    URINE MICROSCOPIC (W/UA)    Collection Time: 07/10/23  6:25 PM   Result Value Ref Range    WBC 20-50 (A) /hpf    RBC  (A) /hpf    Bacteria Negative None /hpf    Epithelial Cells Rare /hpf    Trans Epithelial Cells Rare /hpf    Hyaline Cast 0-2 /lpf    Budding Yeast Present (A) Absent /hpf    Hyphae Yeast Present (A) Absent /hpf   POCT arterial blood gas device results    Collection Time: 07/10/23  7:51 PM   Result Value Ref Range    Ph 7.084 (LL) 7.400 - 7.500    Pco2 30.8 26.0 - 37.0 mmHg    Po2 140 (H) 64 - 87 mmHg    Tco2 10 (L) 20 - 33 mmol/L    S02 98 93 - 99 %    Hco3 9.2 (L) 17.0 - 25.0 mmol/L    BE -19 (L) -4 - 3 mmol/L    Body Temp see below degrees    O2 Therapy 60 %    iPF Ratio 233     Specimen Arterial     DelSys Vent     End Tidal Carbon Dioxide 15 mmhg    Tidal Volume 350 mL    Peep End Expiratory Pressure 8 cmh20    Set Rate 22     Mode APV-CMV        Imaging  DX-ABDOMEN FOR TUBE PLACEMENT   Final Result      NG tube tip projects over the stomach.      DX-CHEST-FOR LINE PLACEMENT Perform procedure in: Patient's Room   Final Result      1.  Well-positioned ETT.   2.  Right IJ central catheter tip is in the right atrium. It can be pulled back 2-3 cm.   3.  Right suprahilar opacity, likely atelectasis.   4.  No other pulmonary opacities. No pneumothorax or pleural effusions.      OUTSIDE IMAGES-CT HEAD   Final Result      CT-ABDOMEN-PELVIS W/O   Final Result      1.  Large complex right pelvic mass measuring at least 11.8 x 11.8 x 16.9 cm which could be ovarian malignancy however suboptimally evaluated without contrast.   2.  Abdominal and pelvic lymphadenopathy in keeping with chel metastases.   3.  Peritoneal metastases and trace ascites.   4.  Left posterolateral abdominal wall with continuous metastasis.   5.  10 mm lingular pulmonary nodule could be metastatic.   6.  Trace right  pleural effusion.   7.  Mild right hydroureteronephrosis likely related to partial obstruction of the right ureter from the right pelvic mass   8.  Hepatosplenomegaly and probable hepatic steatosis.         IR-NEPHROSTOGRAM W/ NEW TUBE PLACEMENT (ALL RADIOLOGY) RIGHT    (Results Pending)   DX-CHEST-PORTABLE (1 VIEW)    (Results Pending)       Assessment/Plan  * Septic shock (HCC)- (present on admission)  Assessment & Plan  This is Septic shock Present on admission  SIRS criteria identified on my evaluation include: Tachycardia, with heart rate greater than 90 BPM, Leukocytosis, with WBC greater than 12,000 and Bandemia, greater than 10% bands  Indicators of septic shock include: Severe sepsis present and persistent hypotension as well as initial lactate level result is greater than or equal to 4mmol/L   Sources is: Urinary  Sepsis protocol initiated.  Crystalloid Fluid Administration: Patient likely has sepsis from urinary source.  She is significantly volume overloaded from end-stage renal disease.  Her overall volume status is significantly consistent with hypervolemia.  She had history of ESBL.  However her cultures so far from [facility] has been negative.  Her antibiotics were recently de-escalated to ceftriaxone.  Plan is to continue the antibiotic ceftriaxone.  IV antibiotics as appropriate for source of sepsis  Reassessment: I have reassessed the patient's hemodynamic status      Acute respiratory failure with hypoxia and hypercapnia (HCC)  Assessment & Plan  Acute hypoxic and hypercapnic respiratory failure in the setting of morbid obesity, gross anasarca and underwent a procedure with sedation.  This led to agonal breathing.  Patient was intubated and placed on mechanical ventilation.  Continue low tidal volume ventilation strategy.  We will consider volume removal with hemodialysis support to improve respiratory mechanics.  We will continue monitor for development of  ventilator associated  pneumonia.  Continue following ventilator order set and protocols.  Monitor blood gases data and  Regular chest x-rays.    Advanced care planning/counseling discussion  Assessment & Plan  I had a long discussion with patient's son and nephew and discussed the current clinical status, the circumstances leading to intubation etc.  Patient's son was in the waiting room when her condition deteriorated and the CODE BLUE was called.  The interventional radiologist discussed intubation with the patient's son and it was decided to proceed with intubation.  Patient is currently intubated and I discussed with the son the goals of care at this point.  I explained to the son that we need to consider proceeding with options that align with her wishes.  I also explained to the patient's son that patient did not want to pursue resuscitation and intubation and patients nephrologist do not think that hemodialysis is in her best interest.  Patient's son endorsed understanding and will discuss with the family.    Acute renal failure (HCC)- (present on admission)  Assessment & Plan  Acute on chronic kidney disease.  Possibly likely secondary to postrenal etiology complicated by prerenal azotemia in the setting of sepsis and significant hypotension causing acute tubular necrosis.  Patient is significantly hypervolemic at this point.  We will continue to monitor urine output, nephrostomy tube output and then monitor.  We will appreciate nephrology input regarding possible dialysis.  Discussed with nephrology plan is to continue bicarb amp pushes at this time.  Avoid bicarb infusion given significant volume overload.  Appreciate nephrology input.      DM (diabetes mellitus) (HCC)- (present on admission)  Assessment & Plan  Discontinue insulin glargine.  Continue to monitor closely.  Placed on low strength sliding scale insulin.        Discussed patient condition and risk of morbidity and/or mortality with Hospitalist, Family, RN, RT,  Therapies, Pharmacy, and [facility].    The patient remains critically ill.  Critical care time = 66 minutes in directly providing and coordinating critical care and extensive data review.  No time overlap and excludes procedures.

## 2023-07-11 NOTE — PROGRESS NOTES
in to comfort pt family and administer pt last rites. Pt resting comfortably, no signs of distress.

## 2023-07-11 NOTE — PROGRESS NOTES
Patient arrived on unit with Code team from IR1. Family updated on POC during procedure.   All drips verified with IR RN. Monitor in place.

## 2023-07-11 NOTE — PROGRESS NOTES
"[facility] Resident Progress Note      Admit Date: 7/5/2023    Resident(s): Irene Shen M.D.   Attending:  ESE WYNN/ Dr. Hale    Patient ID:    Name:  Tena Chaudhry   YOB: 1960  Age:  62 y.o.  female   MRN:  [id number]    Hospital Course (carried forward and updated):  From Dr. Schofield: Tena Chaudhry is a \"62 y.o. female who presented 7/5/2023 with past medical history significant for type 2 diabetes mellitus, dyslipidemia, hypothyroidism, chronic kidney disease and advanced stage IV endometrial cancer who was previously treated at [facility] and presented with sepsis likely from urinary source complicated by acute renal failure and altered mentation.  She was transferred from emergency department in [facility] in [place].  At the emergency department patient was noted to have significant hyponatremia, acute kidney injury with creatinine elevation to 5.1, urinalysis showing positive nitrates and significant leukocyte Estrase, significant anion gap metabolic acidosis and leukocytosis.  She had a history of ESBL  tract infections and hence was a started on ertapenem and vancomycin and was transferred for a higher level of care to [facility].  Patient was admitted on 7/5/2023.  During her admission she underwent a CT scan that showed a large pelvic mass (likely endometrial cancer) with right sided hydronephrosis secondary to ureteral obstruction.  There were also widespread metastatic deposits in the peritoneum and lungs possibly.  Since the CT scan most likely showed an advanced cancer discussions were started with patient's family regarding goals of care including options of hospice and comfort focused care.  Patient's family would like to continue all medical treatment and chemotherapy in the future and a plan was made to relieve the ureteral obstruction with the placement of a percutaneous nephrostomy tube on the right side.  Patient " "had right-sided percutaneous nephrostomy tube placed and towards the end of the procedure a CODE BLUE was called.\"    Family opted for emergent intubation at that time and she was transferred to the ICU for pressor support and mechanical ventilation.     Consultants:  Critical Care  Nephrology  Palliative Medicine  Gynecology Oncology    Interval Events:  Palliative care spoke with family this morning and have decided to likely transition to comfort care measures after family and  have visited her.     NEURO:   Sedated on fentanyl and Precedex  CARDIOVASC:  BP 90s/50s on Levophed 0.55 and vasopressin 0.03  RESPIRATORY:  Ventilated with settings 20/350/8/40%  GI/NUTRITION:  NPO, has Fibersource tube feeds  RENAL/FLUID/LYTES: Creatinine 6.44 (up from 5.8), anuric  HEME/ONC:   Hemoglobin stable 8.2  INFECTIOUS D:  Ceftriaxone   ENDOCRINE:   N/a     Vitals Range last 24h:  Temp:  [34.2 °C (93.6 °F)-37.4 °C (99.3 °F)] 37.4 °C (99.3 °F)  Pulse:  [] 116  Resp:  [7-36] 15  BP: ()/(45-82) 87/49  SpO2:  [89 %-100 %] 95 %      Intake/Output Summary (Last 24 hours) at 7/11/2023 1135  Last data filed at 7/11/2023 1000  Gross per 24 hour   Intake 2225.48 ml   Output 80 ml   Net 2145.48 ml        Review of Systems   Reason unable to perform ROS: sedated, mechanically ventilated.       PHYSICAL EXAM:  Vitals:    07/11/23 1030 07/11/23 1045 07/11/23 1100 07/11/23 1115   BP: 95/49 (!) 85/48 90/50 (!) 87/49   Pulse: (!) 123 (!) 55 (!) 41 (!) 116   Resp: 18 16 14 15   Temp:       TempSrc:       SpO2: 98%  96% 95%   Weight:       Height:        Body mass index is 48.93 kg/m².    O2 therapy: Pulse Oximetry: 95 %, O2 (LPM): 20, O2 Delivery Device: Ventilator    Date 07/11/23 0700 - 07/12/23 0659   Shift 1108-4550 3471-9512 1029-0471 24 Hour Total   INTAKE   I.V. 276.3   276.3     Fentanyl Volume 0.7   0.7     Precedex Volume 26.3   26.3     Vasopressin Volume 36   36     Norepinephrine Volume 213.3   213.3 "   Shift Total 276.3   276.3   OUTPUT   Shift Total       .3   276.3        Physical Exam  Vitals and nursing note reviewed.   Constitutional:       General: She is not in acute distress.     Appearance: She is ill-appearing. She is not toxic-appearing or diaphoretic.      Comments: Sedated    HENT:      Head: Normocephalic and atraumatic.   Eyes:      Pupils: Pupils are equal, round, and reactive to light.   Cardiovascular:      Rate and Rhythm: Normal rate and regular rhythm.      Heart sounds: No murmur heard.  Pulmonary:      Breath sounds: No wheezing or rhonchi.      Comments: Ventilated   Abdominal:      General: Bowel sounds are normal.      Palpations: Abdomen is soft.   Musculoskeletal:      Right lower leg: Edema (2-3+ up to hip) present.      Left lower leg: Edema (2-3+ up to hip) present.   Skin:     Findings: Bruising (chest, arms (scattered)) present.   Neurological:      Comments: Not following commands, not opening eyes to voice         Recent Labs     07/10/23  1951 07/11/23  0207   ISTATAPH 7.084* 7.092*   ISTATAPCO2 30.8 34.6   ISTATAPO2 140* 95*   ISTATATCO2 10* 12*   UISDYRM7MSN 98 94   ISTATARTHCO3 9.2* 10.6*   ISTATARTBE -19* -18*   ISTATTEMP see below 35.1 C   ISTATFIO2 60 40   ISTATSPEC Arterial Arterial   ISTATAPHTC  --  7.117*   FAZPNQLN1MP  --  85     Recent Labs     07/10/23  0657 07/10/23  1800 07/11/23  0220   SODIUM 124* 124* 127*   POTASSIUM 4.8 4.9 5.6*   CHLORIDE 89* 89* 89*   CO2 11* 10* 10*   BUN 94* 95* 97*   CREATININE 5.80* 6.05* 6.49*   MAGNESIUM  --   --  2.3   PHOSPHORUS  --   --  9.4*   CALCIUM 7.8* 8.1* 8.1*     Recent Labs     07/10/23  0657 07/10/23  1800 07/11/23  0220   ALTSGPT 57* 65* 91*   ASTSGOT 397* 478* 771*   ALKPHOSPHAT 806* 983* 984*   TBILIRUBIN 1.5 1.6* 1.8*   PREALBUMIN  --  3.4* <3.0*   GLUCOSE 94 105* 100*     Recent Labs     07/10/23  0657 07/10/23  0808 07/10/23  1800 07/11/23  0220   RBC 2.80*  --  3.00* 3.04*   HEMOGLOBIN 7.5*  --  8.0* 8.2*    HEMATOCRIT 23.9*  --  26.4* 27.1*   PLATELETCT 64*  --  76* 77*   PROTHROMBTM  --  21.7* 22.5*  --    INR  --  1.95* 2.05*  --      Recent Labs     07/10/23  0657 07/10/23  1800 07/11/23  0220   WBC 17.4* 22.1* 21.7*   NEUTSPOLYS  --  91.60* 94.90*   LYMPHOCYTES  --  3.40* 3.40*   MONOCYTES  --  1.70 0.80   EOSINOPHILS  --  0.00 0.00   BASOPHILS  --  0.00 0.00   ASTSGOT 397* 478* 771*   ALTSGPT 57* 65* 91*   ALKPHOSPHAT 806* 983* 984*   TBILIRUBIN 1.5 1.6* 1.8*       Meds:   insulin regular  1-6 Units      And    dextrose bolus  25 g 25 g (07/11/23 0547)    HYDROmorphone        HYDROmorphone  0.5-2 mg      NORepinephrine  0-1 mcg/kg/min (Ideal) 0.55 mcg/kg/min (07/11/23 0831)    And    vasopressin (Vasostrict) 20 Units in  mL Infusion  0.03 Units/min 0.03 Units/min (07/11/23 0505)    Respiratory Therapy Consult        famotidine  20 mg      Or    famotidine  20 mg      senna-docusate  2 Tablet      And    polyethylene glycol/lytes  1 Packet      And    magnesium hydroxide  30 mL      And    bisacodyl  10 mg      lidocaine  2 mL      Pharmacy  1 Each      dexmedetomidine (Precedex) infusion  0-1.5 mcg/kg/hr (Ideal) 0.5 mcg/kg/hr (07/11/23 0610)    levothyroxine  175 mcg      acetaminophen  650 mg      cefTRIAXone (ROCEPHIN) IV  2,000 mg      labetalol  10 mg          Procedures:  7/10/23- R antegrade nephrostogram with PCN placement  7/10/23- Emergent endoctracheal intubation     Imaging:  DX-CHEST-PORTABLE (1 VIEW)   Final Result      1.  Improving right suprahilar opacity, likely atelectasis.      DX-ABDOMEN FOR TUBE PLACEMENT   Final Result      NG tube tip projects over the stomach.      DX-CHEST-FOR LINE PLACEMENT Perform procedure in: Patient's Room   Final Result      1.  Well-positioned ETT.   2.  Right IJ central catheter tip is in the right atrium. It can be pulled back 2-3 cm.   3.  Right suprahilar opacity, likely atelectasis.   4.  No other pulmonary opacities. No pneumothorax or pleural  effusions.      OUTSIDE IMAGES-CT HEAD   Final Result      CT-ABDOMEN-PELVIS W/O   Final Result      1.  Large complex right pelvic mass measuring at least 11.8 x 11.8 x 16.9 cm which could be ovarian malignancy however suboptimally evaluated without contrast.   2.  Abdominal and pelvic lymphadenopathy in keeping with chel metastases.   3.  Peritoneal metastases and trace ascites.   4.  Left posterolateral abdominal wall with continuous metastasis.   5.  10 mm lingular pulmonary nodule could be metastatic.   6.  Trace right pleural effusion.   7.  Mild right hydroureteronephrosis likely related to partial obstruction of the right ureter from the right pelvic mass   8.  Hepatosplenomegaly and probable hepatic steatosis.         IR-NEPHROSTOGRAM W/ NEW TUBE PLACEMENT (ALL RADIOLOGY) RIGHT    (Results Pending)       ASSESSEMENT and PLAN:     * Septic shock (HCC)- (present on admission)  Assessment & Plan  S/p treatment, ongoing treatment for UTI  History of ESBL  History of C. difficile, currently negative  Completing ceftriaxone course  Levophed for MAP >65  Pending decision on comfort care to guide further management    Endometrial carcinoma (HCC)- (present on admission)  Assessment & Plan  Advanced, follow-up CT noted  Stage 4 with abdominal carcinomatosis, metastatic disease  Patient generally followed at [facility]  Gyn oncology consulted, recommended nephrostomy tube via IR for renal dysfunction (placed 7/10 with perforation of renal collecting system)  Palliative care held family meeting including her gyn onc from [facility], family has decided likely on comfort measures after family and  have time to visit her    Acute-on-chronic kidney injury (HCC)- (present on admission)  Assessment & Plan  On prior CKD  Possibly dehydration with concurrent infection  Versus progressive tumor burden with obstruction  Not responsive to fluids  S/p nephrostomy tube placement complicated by collecting duct system perforation    Kidney function continues to worsen, patient and son agreeable to nephrostomy tube  IR consult placed for R nephrostomy tube vs ureter stent, NPO IR to place today  Nephrology following    Anemia requiring transfusions  Assessment & Plan  No current overt blood loss noted  Transfusions as needed if comfort care not pursued    Hyponatremia- (present on admission)  Assessment & Plan  Multifactorial with renal failure    Encephalopathy acute- (present on admission)  Assessment & Plan  Multifactorial, underlying malignancy, renal failure,  Overall nonfocal      Hypothyroidism- (present on admission)  Assessment & Plan  Continue replacement    HLD (hyperlipidemia)- (present on admission)  Assessment & Plan  History of, currently not a candidate for treatment    Type 2 diabetes mellitus (HCC)- (present on admission)  Assessment & Plan  Pending decision on comfort care will pursue ongoing glycemic control, monitoring        DISPO: pending decision on comfort care measures    CODE STATUS: DNAR, I okay    Quality Measures:  Feeding: Fibersource HN tube feeds  Analgesia: prn Dilaudid  Sedation: fentanyl, Precedex  Thromboprophylaxis: SCD  Head of bed: >30 degrees  Ulcer prophylaxis: famotidine  Glycemic control: Correctional:  SSI regular  Bowel care: bowel regimen  Indwelling lines: PIV, chemo port  Deescalation of antibiotics: pending decision on comfort care      Irene Shen M.D. PGY-2

## 2023-07-11 NOTE — ASSESSMENT & PLAN NOTE
Discontinue insulin glargine.  Continue to monitor closely.  Placed on low strength sliding scale insulin.

## 2023-07-11 NOTE — CARE PLAN
The patient is Stable - Low risk of patient condition declining or worsening    Shift Goals  Clinical Goals: safety, pain mgmt  Patient Goals: comfort, sleep  Family Goals: JANEL    Progress made toward(s) clinical / shift goals:    Problem: Pain - Standard  Goal: Alleviation of pain or a reduction in pain to the patient’s comfort goal  Description: Target End Date:  Prior to discharge or change in level of care    Document on Vitals flowsheet    1.  Document pain using the appropriate pain scale per order or unit policy  2.  Educate and implement non-pharmacologic comfort measures (i.e. relaxation, distraction, massage, cold/heat therapy, etc.)  3.  Pain management medications as ordered  4.  Reassess pain after pain med administration per policy  5.  If opiods administered assess patient's response to pain medication is appropriate per POSS sedation scale  6.  Follow pain management plan developed in collaboration with patient and interdisciplinary team (including palliative care or pain specialists if applicable)  Outcome: Progressing  Note: Pain assessed     Problem: Safety - Medical Restraint  Goal: Remains free of injury from restraints (Restraint for Interference with Medical Device)  Description: INTERVENTIONS:  1. Determine that other, less restrictive measures have been tried or would not be effective before applying the restraint  2. Evaluate the patient's condition at the time of restraint application  3. Educate patient/family regarding the reason for restraint  4. Q2H: Monitor safety, psychosocial status, comfort, circulation, respiratory status, LOC, nutrition and hydration  Outcome: Progressing  Flowsheets (Taken 7/10/2023 1936)  Addressed this shift: Remains free of injury from restraints (restraint for interference with medical device):   Evaluate the patient's condition at the time of restraint application   Inform patient/family regarding the reason for restraint   Every 2 hours: Monitor safety,  psychosocial status, comfort, nutrition and hydration   Determine that other, less restrictive measures have been tried or would not be effective before applying the restraint  Note: Restraints in place for ETT tube       Patient is not progressing towards the following goals:

## 2023-07-11 NOTE — ASSESSMENT & PLAN NOTE
Acute hypoxic and hypercapnic respiratory failure in the setting of morbid obesity, gross anasarca and underwent a procedure with sedation.  This led to agonal breathing.  Patient was intubated and placed on mechanical ventilation.  Continue low tidal volume ventilation strategy.  We will consider volume removal with hemodialysis support to improve respiratory mechanics.  We will continue monitor for development of  ventilator associated pneumonia.  Continue following ventilator order set and protocols.  Monitor blood gases data and  Regular chest x-rays.

## 2023-07-11 NOTE — PROGRESS NOTES
Family told this RN about readiness for comfort care. Dr Hael and Dr Shen notified, orders placed, 5 mg of morphine and 5 mg of ativan to be given per MD. All patient pressors and sedation stopped and patient was extubated. Patient passed 1515, Dr Hale and Dr Shen notified.

## 2023-07-11 NOTE — DISCHARGE SUMMARY
[facility] Death Summary      Attending: Maxx Hale M.d.  Resident: Irene Shen MD  Call Back Contact Number: [phone]    Admission Date  7/5/2023    Cause of Death  Septic shock due to undetermined etiology    Significant contributor: stage 4 endometrial carcinoma    Comorbid Conditions at the Time of Death  Principal Problem:    Septic shock (HCC) (POA: Yes)  Active Problems:    Acute respiratory failure with hypoxia and hypercapnia (HCC) (POA: Unknown)    Endometrial carcinoma (HCC) (POA: Yes)    Acute-on-chronic kidney injury (HCC) (POA: Yes)    Encephalopathy acute (POA: Yes)    Type 2 diabetes mellitus (HCC) (POA: Yes)    HLD (hyperlipidemia) (POA: Yes)    Hypothyroidism (POA: Yes)      History of Presenting Illness and Hospital Course  This is a 62 y.o. female admitted 7/5/2023 with a medical history significant for stage 4 endometrial carcinoma, chronic kidney disease who was transferred to [facility] for higher level of care in the setting of sepsis likely from UTI source, complicated by acute renal failure with encephalopathy. While here and abdominal CT scan revealed peritoneal carcinomatosis with possible lung involvement as well as a large pelvic mass causing ureteral obstruction with hydronephrosis. Attempt to place a nephrostomy tube was complicated by acute respiratory compromise for which emergent intubation was performed, and she was started on pressor support for septic shock. Extensive family discussions were held with the involvement of palliative medicine as well as her gyn-onc provider in [place], and the family appropriately decided upon comfort measures after deliberation.       Consultants  Critical Care  Nephrology  Palliative Medicine  Gynecology Oncology    Procedures  7/10/23- R antegrade nephrostogram with PCN placement  7/10/23- Emergent endoctracheal intubation         Time of death: 15:15, 07/11/2023

## 2023-07-11 NOTE — ASSESSMENT & PLAN NOTE
This is Septic shock Present on admission  SIRS criteria identified on my evaluation include: Tachycardia, with heart rate greater than 90 BPM, Leukocytosis, with WBC greater than 12,000 and Bandemia, greater than 10% bands  Indicators of septic shock include: Severe sepsis present and persistent hypotension as well as initial lactate level result is greater than or equal to 4mmol/L   Sources is: Urinary  Sepsis protocol initiated.  Crystalloid Fluid Administration: Patient likely has sepsis from urinary source.  She is significantly volume overloaded from end-stage renal disease.  Her overall volume status is significantly consistent with hypervolemia.  She had history of ESBL.  However her cultures so [facility] from [facility] has been negative.  Her antibiotics were recently de-escalated to ceftriaxone.  Plan is to continue the antibiotic ceftriaxone.  IV antibiotics as appropriate for source of sepsis  Reassessment: I have reassessed the patient's hemodynamic status

## 2023-07-11 NOTE — ASSESSMENT & PLAN NOTE
Present on admission  Suspect urinary source  Continue ceftriaxone  I am titrating norepinephrine to keep mean arterial pressure greater than 65

## 2023-07-11 NOTE — PROCEDURES
Intubation    Date/Time: 7/10/2023 7:42 PM    Performed by: Rickey Schofield M.D.  Authorized by: Rickey Schofield M.D.    Consent:     Consent obtained:  Emergent situation    Consent given by: Patient's son Salud Angel.    Alternatives discussed:  No treatment  Pre-procedure details:     Patient status:  Unresponsive    Mallampati score:  IV    Pretreatment medications:  None (Etomidate)    Paralytics:  Rocuronium  Procedure details:     Preoxygenation:  Bag valve mask    CPR in progress: no      Intubation method:  Oral    Oral intubation technique:  Video-assisted    Laryngoscope type:  GlideScope    Laryngoscope blade:  Mac 3    Cormack-Lehane Classification:  Grade 2b    Tube size (mm):  8.0    Tube type:  Cuffed    Number of attempts:  1    Tube visualized through cords: yes    Placement assessment:     Tube secured with:  Adhesive tape    Breath sounds:  Equal    Placement verification: chest rise    Post-procedure details:     Patient tolerance of procedure:  Tolerated well, no immediate complications

## 2023-07-11 NOTE — ASSESSMENT & PLAN NOTE
Acute metabolic encephalopathy and acute septic encephalopathy  Limit sedatives and mind altering medications is much as possible  Follow neuro exam

## 2023-07-12 LAB
GRAM STN SPEC: NORMAL
SIGNIFICANT IND 70042: NORMAL
SITE SITE: NORMAL
SOURCE SOURCE: NORMAL

## 2023-07-13 LAB
BACTERIA SPEC RESP CULT: ABNORMAL
BACTERIA SPEC RESP CULT: ABNORMAL
GRAM STN SPEC: ABNORMAL
SIGNIFICANT IND 70042: ABNORMAL
SITE SITE: ABNORMAL
SOURCE SOURCE: ABNORMAL

## 2023-07-15 LAB
BACTERIA BLD CULT: NORMAL
BACTERIA BLD CULT: NORMAL
SIGNIFICANT IND 70042: NORMAL
SIGNIFICANT IND 70042: NORMAL
SITE SITE: NORMAL
SITE SITE: NORMAL
SOURCE SOURCE: NORMAL
SOURCE SOURCE: NORMAL

## 2023-11-16 NOTE — PROGRESS NOTES
Please refill if appropriate or refuse medication if not appropriate.     Last refill: 9/12/2022    PCP: Nadir Redmond MD    Future Appointments   Date Time Provider [facility] [address]   11/22/2023  8:20 AM Nadir Redmond MD [facility] AMB       Requested Prescriptions     Pending Prescriptions Disp Refills    FLUoxetine (PROZAC) 20 MG capsule [Pharmacy Med Name: FLUoxetine HCL 20 MG CAPSULE] 90 capsule      Sig: TAKE ONE CAPSULE BY MOUTH DAILY Pulmonary and Critical Care Medicine Progress Note    I had the pleasure of discussing this lady's current critical condition as well as goals of care with the son and nephew at the bedside.  After extensive discussion, they have decided to transition to comfort measures only.  I certainly agree with this decision.  This is entirely appropriate.    Maxx Hale MD  Pulmonary and Critical Care Medicine